# Patient Record
Sex: MALE | Race: WHITE | NOT HISPANIC OR LATINO | Employment: UNEMPLOYED | URBAN - METROPOLITAN AREA
[De-identification: names, ages, dates, MRNs, and addresses within clinical notes are randomized per-mention and may not be internally consistent; named-entity substitution may affect disease eponyms.]

---

## 2017-02-09 ENCOUNTER — CLINICAL SUPPORT (OUTPATIENT)
Dept: DERMATOLOGY | Facility: CLINIC | Age: 57
End: 2017-02-09
Payer: COMMERCIAL

## 2017-02-09 DIAGNOSIS — L57.0 MULTIPLE ACTINIC KERATOSES: ICD-10-CM

## 2017-02-09 PROCEDURE — 99499 UNLISTED E&M SERVICE: CPT | Mod: S$GLB,,, | Performed by: DERMATOLOGY

## 2017-02-09 PROCEDURE — 99999 PR PBB SHADOW E&M-EST. PATIENT-LVL I: CPT | Mod: PBBFAC,,,

## 2017-02-09 PROCEDURE — 96567 PDT DSTR PRMLG LES SKN: CPT | Mod: S$GLB,,, | Performed by: DERMATOLOGY

## 2017-02-09 NOTE — PROGRESS NOTES
Photodynamic Therapy Note.    PDT ordered per Dr. Costello    Patient here today for treatment of actinic keratoses using photodynamic therapy.  Risks including but not limited to burning, stinging, redness, swelling, crusting or blistering of the skin of the area treated were discussed with patient.  Patient elects to proceed with photodynamic therapy.    Treatment area:  Scalp  Treatment area cleaned with rubbing alcohol, Levulan Kerastick (1) applied evenly to entire surface and allowed to absorb for 90 minutes.  Patient then placed under Hugo-U light for 16 minutes 40 seconds.    Patient tolerated treatment well with only mild but tolerable symptoms of discomfort.  Area washed gently with mild soap and water; Zinc oxide sunscreen applied.  Patient advised to avoid any significant light exposure (sun and artificial) for next 48 hours.    RTC:  In 1 month or sooner if concern arises.

## 2017-03-02 ENCOUNTER — CLINICAL SUPPORT (OUTPATIENT)
Dept: DERMATOLOGY | Facility: CLINIC | Age: 57
End: 2017-03-02
Payer: COMMERCIAL

## 2017-03-02 DIAGNOSIS — L57.0 MULTIPLE ACTINIC KERATOSES: ICD-10-CM

## 2017-03-02 PROCEDURE — 99499 UNLISTED E&M SERVICE: CPT | Mod: S$GLB,,, | Performed by: DERMATOLOGY

## 2017-03-02 PROCEDURE — 96567 PDT DSTR PRMLG LES SKN: CPT | Mod: S$GLB,,, | Performed by: DERMATOLOGY

## 2017-03-02 PROCEDURE — 99999 PR PBB SHADOW E&M-EST. PATIENT-LVL I: CPT | Mod: PBBFAC,,,

## 2017-03-02 NOTE — PROGRESS NOTES
Photodynamic Therapy Note.    PDT ordered per Dr. Costello    Patient here today for treatment of actinic keratoses using photodynamic therapy.  Risks including but not limited to burning, stinging, redness, swelling, crusting or blistering of the skin of the area treated were discussed with patient.  Patient elects to proceed with photodynamic therapy.    Treatment area:  Face  Treatment area cleaned with rubbing alcohol, Levulan Kerastick (1) applied evenly to entire surface and allowed to absorb for 60 minutes.  Patient then placed under Hugo-U light for 16 minutes 40 seconds.    Patient tolerated treatment well with only mild but tolerable symptoms of discomfort.  Area washed gently with mild soap and water; Zinc oxide sunscreen applied.  Patient advised to avoid any significant light exposure (sun and artificial) for next 48 hours.    RTC:  In 1 month or sooner if concern arises.

## 2017-03-31 ENCOUNTER — OFFICE VISIT (OUTPATIENT)
Dept: DERMATOLOGY | Facility: CLINIC | Age: 57
End: 2017-03-31
Payer: COMMERCIAL

## 2017-03-31 VITALS — BODY MASS INDEX: 38.74 KG/M2 | WEIGHT: 270 LBS

## 2017-03-31 DIAGNOSIS — L57.0 MULTIPLE ACTINIC KERATOSES: Primary | ICD-10-CM

## 2017-03-31 DIAGNOSIS — Z85.828 HISTORY OF SKIN CANCER: ICD-10-CM

## 2017-03-31 PROCEDURE — 99213 OFFICE O/P EST LOW 20 MIN: CPT | Mod: 25,S$GLB,, | Performed by: DERMATOLOGY

## 2017-03-31 PROCEDURE — 99999 PR PBB SHADOW E&M-EST. PATIENT-LVL II: CPT | Mod: PBBFAC,,, | Performed by: DERMATOLOGY

## 2017-03-31 PROCEDURE — 17003 DESTRUCT PREMALG LES 2-14: CPT | Mod: S$GLB,,, | Performed by: DERMATOLOGY

## 2017-03-31 PROCEDURE — 1160F RVW MEDS BY RX/DR IN RCRD: CPT | Mod: S$GLB,,, | Performed by: DERMATOLOGY

## 2017-03-31 PROCEDURE — 17000 DESTRUCT PREMALG LESION: CPT | Mod: S$GLB,,, | Performed by: DERMATOLOGY

## 2017-03-31 NOTE — MR AVS SNAPSHOT
[facility]   [facility]  [place] [address]  Phone: [phone]  Fax: [phone]                  David Diamond   3/31/2017 10:10 AM   Office Visit    Description:  Male : 1960   Provider:  Rhonda Costello MD   Department:  [facility]           Reason for Visit     Follow-up           Diagnoses this Visit        Comments    Multiple actinic keratoses    -  Primary     History of skin cancer     scc scalp           To Do List           Goals (5 Years of Data)     None      Follow-Up and Disposition     Return in about 6 months (around 2017).      [facility]     [facility] -  Assistance  Unless otherwise directed by your provider, please contact [facility], our nurse care line that is available for  assistance.     Registered nurses in the [facility] Center provide: appointment scheduling, clinical advisement, health education, and other advisory services.  Call: [phone] (toll free)               Medications           Message regarding Medications     Verify the changes and/or additions to your medication regime listed below are the same as discussed with your clinician today.  If any of these changes or additions are incorrect, please notify your healthcare provider.             Verify that the below list of medications is an accurate representation of the medications you are currently taking.  If none reported, the list may be blank. If incorrect, please contact your healthcare provider. Carry this list with you in case of emergency.           Current Medications     acyclovir (ZOVIRAX) 200 MG capsule Take 1 capsule (200 mg total) by mouth every 4 (four) hours while awake.    fluorouracil (EFUDEX) 5 % cream Use hs on scalp           Clinical Reference Information           Your Vitals Were     Weight BMI             122.5 kg (270 lb) 38.74 kg/m2         Allergies as of 3/31/2017     Pcn [Penicillins]       Immunizations Administered on Date of Encounter - 3/31/2017     None      [facility] Sign-Up     Activating your [facility] account is as easy as 1-2-3!     1) Visit [email or web address], select Sign Up Now, enter this activation code and your date of birth, then select Next.  [email or web address]  Expires: 5/15/2017 11:42 AM      2) Create a username and password to use when you visit [facility] in the future and select a security question in case you lose your password and select Next.    3) Enter your e-mail address and click Sign Up!    Additional Information  If you have questions, please e-mail [email or web address] or call [phone] to talk to our [facility] staff. Remember, [facility] is NOT to be used for urgent needs. For medical emergencies, dial 911.         Language Assistance Services     ATTENTION: Language assistance services are available, free of charge. Please call [phone].      ATENCIÓN: Si habla español, tiene a kiran disposición servicios gratuitos de asistencia lingüística. Llame al [phone].     GRACE Ý: N?u b?n nói Ti?ng Vi?t, có các d?ch v? h? tr? ngôn ng? mi?n phí dành cho b?n. G?i s? [phone].         [facility] complies with applicable Federal civil rights laws and does not discriminate on the basis of race, color, national origin, age, disability, or sex.

## 2017-03-31 NOTE — PROGRESS NOTES
Subjective:       Patient ID:  David Diamond is a 57 y.o. male who presents for   Chief Complaint   Patient presents with    Follow-up     PDT face and scalp      HPI Comments: History of Present Illness: The patient presents with chief complaint of spot .  Location: left ear  Duration: weeks  Signs/Symptoms: growing    Prior treatments: none        Review of Systems   Constitutional: Negative for fever.   Skin: Negative for itching and rash.   Hematologic/Lymphatic: Does not bruise/bleed easily.        Objective:    Physical Exam   Constitutional: He appears well-developed and well-nourished. No distress.   Neurological: He is alert and oriented to person, place, and time. He is not disoriented.   Psychiatric: He has a normal mood and affect.   Skin:   Areas Examined (abnormalities noted in diagram):   Head / Face Inspection Performed  Neck Inspection Performed  Chest / Axilla Inspection Performed  Back Inspection Performed  RUE Inspected  LUE Inspection Performed                   Diagram Legend     Erythematous scaling macule/papule c/w actinic keratosis        Surgical scar with no sign of skin cancer recurrence     Assessment / Plan:        Multiple actinic keratoses   Cryosurgery Procedure Note    Verbal consent from the patient is obtained and the patient is aware of the precancerous quality and need for treatment of these lesions. Liquid nitrogen cryosurgery is applied to the 2 actinic keratoses, as detailed in the physical exam, to produce a freeze injury.      History of skin cancer  Comments:  scc scalp             Return in about 6 months (around 9/30/2017).

## 2017-06-28 DIAGNOSIS — Z12.11 COLON CANCER SCREENING: ICD-10-CM

## 2018-02-12 ENCOUNTER — TELEPHONE (OUTPATIENT)
Dept: DERMATOLOGY | Facility: CLINIC | Age: 58
End: 2018-02-12

## 2018-02-12 NOTE — TELEPHONE ENCOUNTER
----- Message from Norma Berkowitz sent at 2/12/2018  4:23 PM CST -----  Contact: pts wife   MCR-pt- pts wife is calling to speak with the nurse pt has a spot on his scalp that is cancerous pt has had skin cancer surgery pt sees Dr Melgar pts wife  on the same spot pt needs to be seen asap can you please call pts wife at [phone] or home [phone]    CRISTINA

## 2018-02-22 ENCOUNTER — TELEPHONE (OUTPATIENT)
Dept: DERMATOLOGY | Facility: CLINIC | Age: 58
End: 2018-02-22

## 2018-02-22 NOTE — TELEPHONE ENCOUNTER
----- Message from Norma Berkowitz sent at 2/22/2018 10:41 AM CST -----  Contact: pts wife   MCR-pt- pt is returning the nurses phone call pt is still out of town he will keep the 3-9 appt if nothing else comes up earlier before 3-9 can you please call pts wife on her cell phone at   [phone] or home [phone].     CRISTINA

## 2018-02-28 ENCOUNTER — OFFICE VISIT (OUTPATIENT)
Dept: DERMATOLOGY | Facility: CLINIC | Age: 58
End: 2018-02-28
Payer: COMMERCIAL

## 2018-02-28 VITALS — BODY MASS INDEX: 38.74 KG/M2 | WEIGHT: 270 LBS

## 2018-02-28 DIAGNOSIS — L57.0 MULTIPLE ACTINIC KERATOSES: Primary | ICD-10-CM

## 2018-02-28 DIAGNOSIS — L82.1 SEBORRHEIC KERATOSES: ICD-10-CM

## 2018-02-28 DIAGNOSIS — Z85.828 HISTORY OF SKIN CANCER: ICD-10-CM

## 2018-02-28 DIAGNOSIS — L81.4 LENTIGINES: ICD-10-CM

## 2018-02-28 PROCEDURE — 99213 OFFICE O/P EST LOW 20 MIN: CPT | Mod: 25,S$GLB,, | Performed by: DERMATOLOGY

## 2018-02-28 PROCEDURE — 17000 DESTRUCT PREMALG LESION: CPT | Mod: S$GLB,,, | Performed by: DERMATOLOGY

## 2018-02-28 PROCEDURE — 99999 PR PBB SHADOW E&M-EST. PATIENT-LVL III: CPT | Mod: PBBFAC,,, | Performed by: DERMATOLOGY

## 2018-02-28 PROCEDURE — 17003 DESTRUCT PREMALG LES 2-14: CPT | Mod: S$GLB,,, | Performed by: DERMATOLOGY

## 2018-02-28 NOTE — PROGRESS NOTES
Subjective:       Patient ID:  David Diamond is a 58 y.o. male who presents for   Chief Complaint   Patient presents with    Lesion     scalp     History of Present Illness: The patient presents with chief complaint of spots.  Location: scalp  Duration: over a month  Signs/Symptoms: none    Prior treatments: none          Review of Systems   Constitutional: Negative for fever.   Skin: Negative for itching and rash.   Hematologic/Lymphatic: Does not bruise/bleed easily.        Objective:    Physical Exam   Constitutional: He appears well-developed and well-nourished. No distress.   Neurological: He is alert and oriented to person, place, and time. He is not disoriented.   Psychiatric: He has a normal mood and affect.   Skin:   Areas Examined (abnormalities noted in diagram):   Scalp / Hair Palpated and Inspected  Head / Face Inspection Performed  Neck Inspection Performed  Chest / Axilla Inspection Performed  RUE Inspected  LUE Inspection Performed                   Diagram Legend     Erythematous scaling macule/papule c/w actinic keratosis       Vascular papule c/w angioma      Pigmented verrucoid papule/plaque c/w seborrheic keratosis      Yellow umbilicated papule c/w sebaceous hyperplasia      Irregularly shaped tan macule c/w lentigo     1-2 mm smooth white papules consistent with Milia      Movable subcutaneous cyst with punctum c/w epidermal inclusion cyst      Subcutaneous movable cyst c/w pilar cyst      Firm pink to brown papule c/w dermatofibroma      Pedunculated fleshy papule(s) c/w skin tag(s)      Evenly pigmented macule c/w junctional nevus     Mildly variegated pigmented, slightly irregular-bordered macule c/w mildly atypical nevus      Flesh colored to evenly pigmented papule c/w intradermal nevus       Pink pearly papule/plaque c/w basal cell carcinoma      Erythematous hyperkeratotic cursted plaque c/w SCC      Surgical scar with no sign of skin cancer recurrence      Open and closed comedones  "     Inflammatory papules and pustules      Verrucoid papule consistent consistent with wart     Erythematous eczematous patches and plaques     Dystrophic onycholytic nail with subungual debris c/w onychomycosis     Umbilicated papule    Erythematous-base heme-crusted tan verrucoid plaque consistent with inflamed seborrheic keratosis     Erythematous Silvery Scaling Plaque c/w Psoriasis     See annotation      Assessment / Plan:        Multiple actinic keratoses   Cryosurgery Procedure Note    Verbal consent from the patient is obtained and the patient is aware of the precancerous quality and need for treatment of these lesions. Liquid nitrogen cryosurgery is applied to the 2 actinic keratoses, as detailed in the physical exam, to produce a freeze injury.    History of skin cancer  Scc scalp removed mohs surgery    Seborrheic keratoses  reassurance      Lentigines  The "ABCD" rules to observe pigmented lesions were reviewed.               Follow-up in about 6 months (around 8/28/2018).  "

## 2018-09-13 ENCOUNTER — OFFICE VISIT (OUTPATIENT)
Dept: DERMATOLOGY | Facility: CLINIC | Age: 58
End: 2018-09-13
Payer: COMMERCIAL

## 2018-09-13 VITALS — BODY MASS INDEX: 38.74 KG/M2 | WEIGHT: 270 LBS

## 2018-09-13 DIAGNOSIS — L81.4 LENTIGINES: ICD-10-CM

## 2018-09-13 DIAGNOSIS — L57.0 ACTINIC KERATOSIS: ICD-10-CM

## 2018-09-13 DIAGNOSIS — Z85.828 HISTORY OF SKIN CANCER: ICD-10-CM

## 2018-09-13 DIAGNOSIS — L82.1 SEBORRHEIC KERATOSES: Primary | ICD-10-CM

## 2018-09-13 PROCEDURE — 3008F BODY MASS INDEX DOCD: CPT | Mod: CPTII,S$GLB,, | Performed by: DERMATOLOGY

## 2018-09-13 PROCEDURE — 99999 PR PBB SHADOW E&M-EST. PATIENT-LVL III: CPT | Mod: PBBFAC,,, | Performed by: DERMATOLOGY

## 2018-09-13 PROCEDURE — 17000 DESTRUCT PREMALG LESION: CPT | Mod: S$GLB,,, | Performed by: DERMATOLOGY

## 2018-09-13 PROCEDURE — 99213 OFFICE O/P EST LOW 20 MIN: CPT | Mod: 25,S$GLB,, | Performed by: DERMATOLOGY

## 2018-09-13 NOTE — PROGRESS NOTES
Subjective:       Patient ID:  David Diamond is a 58 y.o. male who presents for   Chief Complaint   Patient presents with    Follow-up     scalp    Spot     forehead     History of Present Illness: The patient presents with chief complaint of spot.  Location: right temple  Duration: over a year  Signs/Symptoms: growing    Prior treatments: none          Review of Systems   Constitutional: Negative for fever.   Skin: Negative for itching and rash.   Hematologic/Lymphatic: Does not bruise/bleed easily.        Objective:    Physical Exam   Constitutional: He appears well-developed and well-nourished. No distress.   Neurological: He is alert and oriented to person, place, and time. He is not disoriented.   Psychiatric: He has a normal mood and affect.   Skin:   Areas Examined (abnormalities noted in diagram):   Scalp / Hair Palpated and Inspected  Head / Face Inspection Performed  Neck Inspection Performed  Chest / Axilla Inspection Performed  RUE Inspected  LUE Inspection Performed                   Diagram Legend     Erythematous scaling macule/papule c/w actinic keratosis       Vascular papule c/w angioma      Pigmented verrucoid papule/plaque c/w seborrheic keratosis      Yellow umbilicated papule c/w sebaceous hyperplasia      Irregularly shaped tan macule c/w lentigo     1-2 mm smooth white papules consistent with Milia      Movable subcutaneous cyst with punctum c/w epidermal inclusion cyst      Subcutaneous movable cyst c/w pilar cyst      Firm pink to brown papule c/w dermatofibroma      Pedunculated fleshy papule(s) c/w skin tag(s)      Evenly pigmented macule c/w junctional nevus     Mildly variegated pigmented, slightly irregular-bordered macule c/w mildly atypical nevus      Flesh colored to evenly pigmented papule c/w intradermal nevus       Pink pearly papule/plaque c/w basal cell carcinoma      Erythematous hyperkeratotic cursted plaque c/w SCC      Surgical scar with no sign of skin cancer  recurrence      Open and closed comedones      Inflammatory papules and pustules      Verrucoid papule consistent consistent with wart     Erythematous eczematous patches and plaques     Dystrophic onycholytic nail with subungual debris c/w onychomycosis     Umbilicated papule    Erythematous-base heme-crusted tan verrucoid plaque consistent with inflamed seborrheic keratosis     Erythematous Silvery Scaling Plaque c/w Psoriasis     See annotation      Assessment / Plan:        Seborrheic keratoses  Brochure provided      Actinic keratosis   Cryosurgery Procedure Note    Verbal consent from the patient is obtained and the patient is aware of the precancerous quality and need for treatment of these lesions. Liquid nitrogen cryosurgery is applied to the 1 actinic keratoses, as detailed in the physical exam, to produce a freeze injury.      History of skin cancer  Scc on scalp    Lentigines  sont sunscreen             Follow-up in about 6 months (around 3/13/2019).

## 2019-02-22 ENCOUNTER — TELEPHONE (OUTPATIENT)
Dept: DERMATOLOGY | Facility: CLINIC | Age: 59
End: 2019-02-22

## 2019-05-07 ENCOUNTER — OFFICE VISIT (OUTPATIENT)
Dept: DERMATOLOGY | Facility: CLINIC | Age: 59
End: 2019-05-07
Payer: COMMERCIAL

## 2019-05-07 VITALS — WEIGHT: 270 LBS | BODY MASS INDEX: 38.74 KG/M2

## 2019-05-07 DIAGNOSIS — L81.4 LENTIGINES: ICD-10-CM

## 2019-05-07 DIAGNOSIS — Z85.828 HISTORY OF SKIN CANCER: ICD-10-CM

## 2019-05-07 DIAGNOSIS — L82.1 SEBORRHEIC KERATOSES: Primary | ICD-10-CM

## 2019-05-07 PROCEDURE — 99213 OFFICE O/P EST LOW 20 MIN: CPT | Mod: S$GLB,,, | Performed by: DERMATOLOGY

## 2019-05-07 PROCEDURE — 3008F PR BODY MASS INDEX (BMI) DOCUMENTED: ICD-10-PCS | Mod: CPTII,S$GLB,, | Performed by: DERMATOLOGY

## 2019-05-07 PROCEDURE — 99999 PR PBB SHADOW E&M-EST. PATIENT-LVL III: ICD-10-PCS | Mod: PBBFAC,,, | Performed by: DERMATOLOGY

## 2019-05-07 PROCEDURE — 99213 PR OFFICE/OUTPT VISIT, EST, LEVL III, 20-29 MIN: ICD-10-PCS | Mod: S$GLB,,, | Performed by: DERMATOLOGY

## 2019-05-07 PROCEDURE — 99999 PR PBB SHADOW E&M-EST. PATIENT-LVL III: CPT | Mod: PBBFAC,,, | Performed by: DERMATOLOGY

## 2019-05-07 PROCEDURE — 3008F BODY MASS INDEX DOCD: CPT | Mod: CPTII,S$GLB,, | Performed by: DERMATOLOGY

## 2019-05-07 NOTE — PROGRESS NOTES
Subjective:       Patient ID:  David Diamond is a 59 y.o. male who presents for   Chief Complaint   Patient presents with    Follow-up     face, scalp     This is a high risk patient here to check for the development of new lesions.        Review of Systems   Constitutional: Negative for fever, chills, weight loss, weight gain, fatigue, night sweats and malaise.   Skin: Positive for daily sunscreen use, activity-related sunscreen use and wears hat.   Hematologic/Lymphatic: Does not bruise/bleed easily.        Objective:    Physical Exam   Constitutional: He appears well-developed and well-nourished. No distress.   Neurological: He is alert and oriented to person, place, and time. He is not disoriented.   Psychiatric: He has a normal mood and affect.   Skin:   Areas Examined (abnormalities noted in diagram):   Scalp / Hair Palpated and Inspected  Head / Face Inspection Performed  Neck Inspection Performed  Chest / Axilla Inspection Performed  RUE Inspected  LUE Inspection Performed                   Diagram Legend     Erythematous scaling macule/papule c/w actinic keratosis       Vascular papule c/w angioma      Pigmented verrucoid papule/plaque c/w seborrheic keratosis      Yellow umbilicated papule c/w sebaceous hyperplasia      Irregularly shaped tan macule c/w lentigo     1-2 mm smooth white papules consistent with Milia      Movable subcutaneous cyst with punctum c/w epidermal inclusion cyst      Subcutaneous movable cyst c/w pilar cyst      Firm pink to brown papule c/w dermatofibroma      Pedunculated fleshy papule(s) c/w skin tag(s)      Evenly pigmented macule c/w junctional nevus     Mildly variegated pigmented, slightly irregular-bordered macule c/w mildly atypical nevus      Flesh colored to evenly pigmented papule c/w intradermal nevus       Pink pearly papule/plaque c/w basal cell carcinoma      Erythematous hyperkeratotic cursted plaque c/w SCC      Surgical scar with no sign of skin cancer  "recurrence      Open and closed comedones      Inflammatory papules and pustules      Verrucoid papule consistent consistent with wart     Erythematous eczematous patches and plaques     Dystrophic onycholytic nail with subungual debris c/w onychomycosis     Umbilicated papule    Erythematous-base heme-crusted tan verrucoid plaque consistent with inflamed seborrheic keratosis     Erythematous Silvery Scaling Plaque c/w Psoriasis     See annotation      Assessment / Plan:        Seborrheic keratoses  reassurance  Brochure provided      History of skin cancer  Comments:  scc in situ scalp removed mohs surgery    Lentigines  The "ABCD" rules to observe pigmented lesions were reviewed.               6 month return  "

## 2021-07-20 ENCOUNTER — TELEPHONE (OUTPATIENT)
Dept: INTERNAL MEDICINE | Facility: CLINIC | Age: 61
End: 2021-07-20

## 2021-08-02 ENCOUNTER — LAB VISIT (OUTPATIENT)
Dept: LAB | Facility: HOSPITAL | Age: 61
End: 2021-08-02
Attending: INTERNAL MEDICINE
Payer: COMMERCIAL

## 2021-08-02 ENCOUNTER — OFFICE VISIT (OUTPATIENT)
Dept: INTERNAL MEDICINE | Facility: CLINIC | Age: 61
End: 2021-08-02
Payer: COMMERCIAL

## 2021-08-02 VITALS
HEART RATE: 92 BPM | OXYGEN SATURATION: 97 % | BODY MASS INDEX: 30.56 KG/M2 | SYSTOLIC BLOOD PRESSURE: 118 MMHG | WEIGHT: 213 LBS | DIASTOLIC BLOOD PRESSURE: 82 MMHG

## 2021-08-02 DIAGNOSIS — Z01.810 PREOP CARDIOVASCULAR EXAM: Primary | ICD-10-CM

## 2021-08-02 DIAGNOSIS — Z12.11 ENCOUNTER FOR FIT (FECAL IMMUNOCHEMICAL TEST) SCREENING: ICD-10-CM

## 2021-08-02 DIAGNOSIS — Z12.5 SCREENING FOR PROSTATE CANCER: ICD-10-CM

## 2021-08-02 DIAGNOSIS — M16.0 BILATERAL HIP JOINT ARTHRITIS: ICD-10-CM

## 2021-08-02 DIAGNOSIS — Z00.00 ROUTINE PHYSICAL EXAMINATION: ICD-10-CM

## 2021-08-02 LAB — COMPLEXED PSA SERPL-MCNC: 3.1 NG/ML (ref 0–4)

## 2021-08-02 PROCEDURE — 1126F PR PAIN SEVERITY QUANTIFIED, NO PAIN PRESENT: ICD-10-PCS | Mod: CPTII,S$GLB,, | Performed by: INTERNAL MEDICINE

## 2021-08-02 PROCEDURE — 84153 ASSAY OF PSA TOTAL: CPT | Performed by: INTERNAL MEDICINE

## 2021-08-02 PROCEDURE — 36415 COLL VENOUS BLD VENIPUNCTURE: CPT | Performed by: INTERNAL MEDICINE

## 2021-08-02 PROCEDURE — 3079F PR MOST RECENT DIASTOLIC BLOOD PRESSURE 80-89 MM HG: ICD-10-PCS | Mod: CPTII,S$GLB,, | Performed by: INTERNAL MEDICINE

## 2021-08-02 PROCEDURE — 3008F BODY MASS INDEX DOCD: CPT | Mod: CPTII,S$GLB,, | Performed by: INTERNAL MEDICINE

## 2021-08-02 PROCEDURE — 1160F RVW MEDS BY RX/DR IN RCRD: CPT | Mod: CPTII,S$GLB,, | Performed by: INTERNAL MEDICINE

## 2021-08-02 PROCEDURE — 1160F PR REVIEW ALL MEDS BY PRESCRIBER/CLIN PHARMACIST DOCUMENTED: ICD-10-PCS | Mod: CPTII,S$GLB,, | Performed by: INTERNAL MEDICINE

## 2021-08-02 PROCEDURE — 3079F DIAST BP 80-89 MM HG: CPT | Mod: CPTII,S$GLB,, | Performed by: INTERNAL MEDICINE

## 2021-08-02 PROCEDURE — 99204 OFFICE O/P NEW MOD 45 MIN: CPT | Mod: S$GLB,,, | Performed by: INTERNAL MEDICINE

## 2021-08-02 PROCEDURE — 3008F PR BODY MASS INDEX (BMI) DOCUMENTED: ICD-10-PCS | Mod: CPTII,S$GLB,, | Performed by: INTERNAL MEDICINE

## 2021-08-02 PROCEDURE — 1126F AMNT PAIN NOTED NONE PRSNT: CPT | Mod: CPTII,S$GLB,, | Performed by: INTERNAL MEDICINE

## 2021-08-02 PROCEDURE — 99999 PR PBB SHADOW E&M-EST. PATIENT-LVL III: CPT | Mod: PBBFAC,,, | Performed by: INTERNAL MEDICINE

## 2021-08-02 PROCEDURE — 3074F SYST BP LT 130 MM HG: CPT | Mod: CPTII,S$GLB,, | Performed by: INTERNAL MEDICINE

## 2021-08-02 PROCEDURE — 3074F PR MOST RECENT SYSTOLIC BLOOD PRESSURE < 130 MM HG: ICD-10-PCS | Mod: CPTII,S$GLB,, | Performed by: INTERNAL MEDICINE

## 2021-08-02 PROCEDURE — 99999 PR PBB SHADOW E&M-EST. PATIENT-LVL III: ICD-10-PCS | Mod: PBBFAC,,, | Performed by: INTERNAL MEDICINE

## 2021-08-02 PROCEDURE — 1159F PR MEDICATION LIST DOCUMENTED IN MEDICAL RECORD: ICD-10-PCS | Mod: CPTII,S$GLB,, | Performed by: INTERNAL MEDICINE

## 2021-08-02 PROCEDURE — 99204 PR OFFICE/OUTPT VISIT, NEW, LEVL IV, 45-59 MIN: ICD-10-PCS | Mod: S$GLB,,, | Performed by: INTERNAL MEDICINE

## 2021-08-02 PROCEDURE — 1159F MED LIST DOCD IN RCRD: CPT | Mod: CPTII,S$GLB,, | Performed by: INTERNAL MEDICINE

## 2021-08-02 PROCEDURE — 80061 LIPID PANEL: CPT | Performed by: INTERNAL MEDICINE

## 2021-08-02 RX ORDER — MELOXICAM 15 MG/1
15 TABLET ORAL DAILY
COMMUNITY
Start: 2021-07-30 | End: 2023-11-24

## 2021-08-03 ENCOUNTER — PATIENT MESSAGE (OUTPATIENT)
Dept: INTERNAL MEDICINE | Facility: CLINIC | Age: 61
End: 2021-08-03

## 2021-08-03 LAB
CHOLEST SERPL-MCNC: 190 MG/DL (ref 120–199)
CHOLEST/HDLC SERPL: 3.2 {RATIO} (ref 2–5)
HDLC SERPL-MCNC: 59 MG/DL (ref 40–75)
HDLC SERPL: 31.1 % (ref 20–50)
LDLC SERPL CALC-MCNC: 109.4 MG/DL (ref 63–159)
NONHDLC SERPL-MCNC: 131 MG/DL
TRIGL SERPL-MCNC: 108 MG/DL (ref 30–150)

## 2021-08-10 ENCOUNTER — TELEPHONE (OUTPATIENT)
Dept: INTERNAL MEDICINE | Facility: CLINIC | Age: 61
End: 2021-08-10

## 2021-10-11 ENCOUNTER — OFFICE VISIT (OUTPATIENT)
Dept: INTERNAL MEDICINE | Facility: CLINIC | Age: 61
End: 2021-10-11
Payer: COMMERCIAL

## 2021-10-11 VITALS
BODY MASS INDEX: 31.82 KG/M2 | WEIGHT: 222.25 LBS | HEIGHT: 70 IN | SYSTOLIC BLOOD PRESSURE: 108 MMHG | HEART RATE: 75 BPM | DIASTOLIC BLOOD PRESSURE: 80 MMHG | OXYGEN SATURATION: 97 %

## 2021-10-11 DIAGNOSIS — Z01.810 PREOP CARDIOVASCULAR EXAM: Primary | ICD-10-CM

## 2021-10-11 DIAGNOSIS — M16.11 ARTHRITIS OF RIGHT HIP: ICD-10-CM

## 2021-10-11 PROCEDURE — 3079F PR MOST RECENT DIASTOLIC BLOOD PRESSURE 80-89 MM HG: ICD-10-PCS | Mod: CPTII,S$GLB,, | Performed by: INTERNAL MEDICINE

## 2021-10-11 PROCEDURE — 99213 OFFICE O/P EST LOW 20 MIN: CPT | Mod: S$GLB,,, | Performed by: INTERNAL MEDICINE

## 2021-10-11 PROCEDURE — 99999 PR PBB SHADOW E&M-EST. PATIENT-LVL III: CPT | Mod: PBBFAC,,, | Performed by: INTERNAL MEDICINE

## 2021-10-11 PROCEDURE — 3008F PR BODY MASS INDEX (BMI) DOCUMENTED: ICD-10-PCS | Mod: CPTII,S$GLB,, | Performed by: INTERNAL MEDICINE

## 2021-10-11 PROCEDURE — 3074F SYST BP LT 130 MM HG: CPT | Mod: CPTII,S$GLB,, | Performed by: INTERNAL MEDICINE

## 2021-10-11 PROCEDURE — 3008F BODY MASS INDEX DOCD: CPT | Mod: CPTII,S$GLB,, | Performed by: INTERNAL MEDICINE

## 2021-10-11 PROCEDURE — 3074F PR MOST RECENT SYSTOLIC BLOOD PRESSURE < 130 MM HG: ICD-10-PCS | Mod: CPTII,S$GLB,, | Performed by: INTERNAL MEDICINE

## 2021-10-11 PROCEDURE — 3079F DIAST BP 80-89 MM HG: CPT | Mod: CPTII,S$GLB,, | Performed by: INTERNAL MEDICINE

## 2021-10-11 PROCEDURE — 1160F PR REVIEW ALL MEDS BY PRESCRIBER/CLIN PHARMACIST DOCUMENTED: ICD-10-PCS | Mod: CPTII,S$GLB,, | Performed by: INTERNAL MEDICINE

## 2021-10-11 PROCEDURE — 1160F RVW MEDS BY RX/DR IN RCRD: CPT | Mod: CPTII,S$GLB,, | Performed by: INTERNAL MEDICINE

## 2021-10-11 PROCEDURE — 99213 PR OFFICE/OUTPT VISIT, EST, LEVL III, 20-29 MIN: ICD-10-PCS | Mod: S$GLB,,, | Performed by: INTERNAL MEDICINE

## 2021-10-11 PROCEDURE — 1159F PR MEDICATION LIST DOCUMENTED IN MEDICAL RECORD: ICD-10-PCS | Mod: CPTII,S$GLB,, | Performed by: INTERNAL MEDICINE

## 2021-10-11 PROCEDURE — 99999 PR PBB SHADOW E&M-EST. PATIENT-LVL III: ICD-10-PCS | Mod: PBBFAC,,, | Performed by: INTERNAL MEDICINE

## 2021-10-11 PROCEDURE — 1159F MED LIST DOCD IN RCRD: CPT | Mod: CPTII,S$GLB,, | Performed by: INTERNAL MEDICINE

## 2021-10-26 ENCOUNTER — TELEPHONE (OUTPATIENT)
Dept: INTERNAL MEDICINE | Facility: CLINIC | Age: 61
End: 2021-10-26
Payer: COMMERCIAL

## 2021-10-28 ENCOUNTER — TELEPHONE (OUTPATIENT)
Dept: INTERNAL MEDICINE | Facility: CLINIC | Age: 61
End: 2021-10-28
Payer: COMMERCIAL

## 2021-10-28 NOTE — TELEPHONE ENCOUNTER
----- Message from Norah Disla sent at 10/28/2021 10:49 AM CDT -----  Contact: Pt @[phone]  Patient is returning a phone call.  Who left a message for the patient: Malia     Does patient know what this is regarding:  yes     Would you like a call back, or a response through your [facility] portal?:  Call Back     Comments:    Pt states that he was last seen by the provider on 10/11/2021. Please call back to advise.

## 2021-10-28 NOTE — TELEPHONE ENCOUNTER
----- Message from Norah Disla sent at 10/28/2021 10:49 AM CDT -----  Contact: Pt @[phone]  Patient is returning a phone call.  Who left a message for the patient: Malia     Does patient know what this is regarding:  yes     Would you like a call back, or a response through your [facility] portal?:  Call Back     Comments:    Pt states that he was last seen by the provider on 10/11/2021. Please call back to advise.

## 2021-10-28 NOTE — TELEPHONE ENCOUNTER
I guess I do not understand the question.  There is a clearance note from me in the chart and I sent a note to his surgeon.  This may have been something done by Connie.  Can we send my clearance note to the orthopedic surgeon and  Let the patient know we are doing so.

## 2021-10-29 NOTE — TELEPHONE ENCOUNTER
OK, tell me what to do because I am about to be out for a week. He did not have the paperwork at the visit so let's figure out how to get him what he needs.

## 2021-11-10 ENCOUNTER — TELEPHONE (OUTPATIENT)
Dept: INTERNAL MEDICINE | Facility: CLINIC | Age: 61
End: 2021-11-10
Payer: COMMERCIAL

## 2022-03-18 ENCOUNTER — PATIENT MESSAGE (OUTPATIENT)
Dept: ADMINISTRATIVE | Facility: HOSPITAL | Age: 62
End: 2022-03-18
Payer: COMMERCIAL

## 2022-04-12 ENCOUNTER — PATIENT OUTREACH (OUTPATIENT)
Dept: ADMINISTRATIVE | Facility: OTHER | Age: 62
End: 2022-04-12
Payer: COMMERCIAL

## 2022-04-13 ENCOUNTER — OFFICE VISIT (OUTPATIENT)
Dept: DERMATOLOGY | Facility: CLINIC | Age: 62
End: 2022-04-13
Payer: COMMERCIAL

## 2022-04-13 VITALS — BODY MASS INDEX: 31.85 KG/M2 | WEIGHT: 222 LBS

## 2022-04-13 DIAGNOSIS — Z85.828 HISTORY OF SKIN CANCER: ICD-10-CM

## 2022-04-13 DIAGNOSIS — L82.1 SEBORRHEIC KERATOSES: ICD-10-CM

## 2022-04-13 DIAGNOSIS — D18.01 CHERRY ANGIOMA: ICD-10-CM

## 2022-04-13 DIAGNOSIS — L57.0 MULTIPLE ACTINIC KERATOSES: Primary | ICD-10-CM

## 2022-04-13 PROCEDURE — 3008F PR BODY MASS INDEX (BMI) DOCUMENTED: ICD-10-PCS | Mod: CPTII,S$GLB,, | Performed by: DERMATOLOGY

## 2022-04-13 PROCEDURE — 1160F PR REVIEW ALL MEDS BY PRESCRIBER/CLIN PHARMACIST DOCUMENTED: ICD-10-PCS | Mod: CPTII,S$GLB,, | Performed by: DERMATOLOGY

## 2022-04-13 PROCEDURE — 17110 PR DESTRUCTION BENIGN LESIONS UP TO 14: ICD-10-PCS | Mod: S$GLB,,, | Performed by: DERMATOLOGY

## 2022-04-13 PROCEDURE — 99999 PR PBB SHADOW E&M-EST. PATIENT-LVL III: CPT | Mod: PBBFAC,,, | Performed by: DERMATOLOGY

## 2022-04-13 PROCEDURE — 99999 PR PBB SHADOW E&M-EST. PATIENT-LVL III: ICD-10-PCS | Mod: PBBFAC,,, | Performed by: DERMATOLOGY

## 2022-04-13 PROCEDURE — 99214 OFFICE O/P EST MOD 30 MIN: CPT | Mod: 25,S$GLB,, | Performed by: DERMATOLOGY

## 2022-04-13 PROCEDURE — 1159F MED LIST DOCD IN RCRD: CPT | Mod: CPTII,S$GLB,, | Performed by: DERMATOLOGY

## 2022-04-13 PROCEDURE — 3008F BODY MASS INDEX DOCD: CPT | Mod: CPTII,S$GLB,, | Performed by: DERMATOLOGY

## 2022-04-13 PROCEDURE — 1160F RVW MEDS BY RX/DR IN RCRD: CPT | Mod: CPTII,S$GLB,, | Performed by: DERMATOLOGY

## 2022-04-13 PROCEDURE — 99214 PR OFFICE/OUTPT VISIT, EST, LEVL IV, 30-39 MIN: ICD-10-PCS | Mod: 25,S$GLB,, | Performed by: DERMATOLOGY

## 2022-04-13 PROCEDURE — 1159F PR MEDICATION LIST DOCUMENTED IN MEDICAL RECORD: ICD-10-PCS | Mod: CPTII,S$GLB,, | Performed by: DERMATOLOGY

## 2022-04-13 PROCEDURE — 17110 DESTRUCTION B9 LES UP TO 14: CPT | Mod: S$GLB,,, | Performed by: DERMATOLOGY

## 2022-04-13 RX ORDER — FLUOROURACIL 50 MG/G
CREAM TOPICAL
Qty: 40 G | Refills: 3 | Status: SHIPPED | OUTPATIENT
Start: 2022-04-13 | End: 2023-08-11

## 2022-04-13 NOTE — PROGRESS NOTES
Subjective:       Patient ID:  David Diamond is a 62 y.o. male who presents for   Chief Complaint   Patient presents with    Follow-up     This is a high risk patient here to check for the development of new lesions.  Spots on scalp and face      Review of Systems   Constitutional: Negative for fever, chills, weight loss, weight gain, fatigue and malaise.   Skin: Positive for daily sunscreen use, activity-related sunscreen use and wears hat.   Hematologic/Lymphatic: Does not bruise/bleed easily.        Objective:    Physical Exam   Constitutional: He appears well-developed and well-nourished. No distress.   Neurological: He is alert and oriented to person, place, and time. He is not disoriented.   Psychiatric: He has a normal mood and affect.   Skin:   Areas Examined (abnormalities noted in diagram):   Head / Face Inspection Performed  Neck Inspection Performed  Chest / Axilla Inspection Performed  Back Inspection Performed  RUE Inspected  LUE Inspection Performed                   Diagram Legend     Erythematous scaling macule/papule c/w actinic keratosis       Vascular papule c/w angioma      Pigmented verrucoid papule/plaque c/w seborrheic keratosis      Yellow umbilicated papule c/w sebaceous hyperplasia      Irregularly shaped tan macule c/w lentigo     1-2 mm smooth white papules consistent with Milia      Movable subcutaneous cyst with punctum c/w epidermal inclusion cyst      Subcutaneous movable cyst c/w pilar cyst      Firm pink to brown papule c/w dermatofibroma      Pedunculated fleshy papule(s) c/w skin tag(s)      Evenly pigmented macule c/w junctional nevus     Mildly variegated pigmented, slightly irregular-bordered macule c/w mildly atypical nevus      Flesh colored to evenly pigmented papule c/w intradermal nevus       Pink pearly papule/plaque c/w basal cell carcinoma      Erythematous hyperkeratotic cursted plaque c/w SCC      Surgical scar with no sign of skin cancer recurrence      Open and  closed comedones      Inflammatory papules and pustules      Verrucoid papule consistent consistent with wart     Erythematous eczematous patches and plaques     Dystrophic onycholytic nail with subungual debris c/w onychomycosis     Umbilicated papule    Erythematous-base heme-crusted tan verrucoid plaque consistent with inflamed seborrheic keratosis     Erythematous Silvery Scaling Plaque c/w Psoriasis     See annotation      Assessment / Plan:        Multiple actinic keratoses  -     fluorouraciL (EFUDEX) 5 % cream; Use hs for 2 weeks  Dispense: 40 g; Refill: 3 for scalp    Seborrheic keratoses  Cryosurgery procedure note:    Verbal consent from the patient is obtained including, but not limited to, risk of hypopigmentation/hyperpigmentation, scar, recurrence of lesion. Liquid nitrogen cryosurgery is applied to 2 lesions left forehead to produce a freeze injury.       Gleason angiomas  reassurance      History of skin cancer  Comments:  sccis removed mohs surgery  Area(s) of previous NMSC evaluated with no signs of recurrence.    . No lesions suspicious for malignancy noted.    Recommend daily sun protection/avoidance and use of at least SPF 30, broad spectrum sunscreen (OTC drug). ]  \             Follow up in about 6 months (around 10/13/2022).

## 2022-05-30 ENCOUNTER — PATIENT MESSAGE (OUTPATIENT)
Dept: ADMINISTRATIVE | Facility: HOSPITAL | Age: 62
End: 2022-05-30
Payer: COMMERCIAL

## 2022-12-27 ENCOUNTER — PATIENT OUTREACH (OUTPATIENT)
Dept: ADMINISTRATIVE | Facility: HOSPITAL | Age: 62
End: 2022-12-27
Payer: COMMERCIAL

## 2022-12-29 ENCOUNTER — OFFICE VISIT (OUTPATIENT)
Dept: INTERNAL MEDICINE | Facility: CLINIC | Age: 62
End: 2022-12-29
Payer: COMMERCIAL

## 2022-12-29 ENCOUNTER — LAB VISIT (OUTPATIENT)
Dept: LAB | Facility: HOSPITAL | Age: 62
End: 2022-12-29
Attending: INTERNAL MEDICINE
Payer: COMMERCIAL

## 2022-12-29 ENCOUNTER — DOCUMENTATION ONLY (OUTPATIENT)
Dept: INTERNAL MEDICINE | Facility: CLINIC | Age: 62
End: 2022-12-29

## 2022-12-29 VITALS
HEART RATE: 72 BPM | HEIGHT: 70 IN | DIASTOLIC BLOOD PRESSURE: 74 MMHG | WEIGHT: 186.94 LBS | OXYGEN SATURATION: 98 % | BODY MASS INDEX: 26.76 KG/M2 | SYSTOLIC BLOOD PRESSURE: 102 MMHG

## 2022-12-29 DIAGNOSIS — M16.0 BILATERAL HIP JOINT ARTHRITIS: ICD-10-CM

## 2022-12-29 DIAGNOSIS — Z00.00 ROUTINE PHYSICAL EXAMINATION: Primary | ICD-10-CM

## 2022-12-29 DIAGNOSIS — Z12.11 ENCOUNTER FOR FIT (FECAL IMMUNOCHEMICAL TEST) SCREENING: ICD-10-CM

## 2022-12-29 DIAGNOSIS — Z12.5 SCREENING FOR PROSTATE CANCER: ICD-10-CM

## 2022-12-29 DIAGNOSIS — Z00.00 ROUTINE PHYSICAL EXAMINATION: ICD-10-CM

## 2022-12-29 LAB
ALBUMIN SERPL BCP-MCNC: 4 G/DL (ref 3.5–5.2)
ALP SERPL-CCNC: 46 U/L (ref 55–135)
ALT SERPL W/O P-5'-P-CCNC: 16 U/L (ref 10–44)
ANION GAP SERPL CALC-SCNC: 8 MMOL/L (ref 8–16)
AST SERPL-CCNC: 20 U/L (ref 10–40)
BASOPHILS # BLD AUTO: 0.03 K/UL (ref 0–0.2)
BASOPHILS NFR BLD: 0.6 % (ref 0–1.9)
BILIRUB SERPL-MCNC: 1 MG/DL (ref 0.1–1)
BUN SERPL-MCNC: 20 MG/DL (ref 8–23)
CALCIUM SERPL-MCNC: 8.9 MG/DL (ref 8.7–10.5)
CHLORIDE SERPL-SCNC: 104 MMOL/L (ref 95–110)
CHOLEST SERPL-MCNC: 198 MG/DL (ref 120–199)
CHOLEST/HDLC SERPL: 2.6 {RATIO} (ref 2–5)
CO2 SERPL-SCNC: 26 MMOL/L (ref 23–29)
COMPLEXED PSA SERPL-MCNC: 2.6 NG/ML (ref 0–4)
CREAT SERPL-MCNC: 1.1 MG/DL (ref 0.5–1.4)
DIFFERENTIAL METHOD: ABNORMAL
EOSINOPHIL # BLD AUTO: 0.1 K/UL (ref 0–0.5)
EOSINOPHIL NFR BLD: 1.9 % (ref 0–8)
ERYTHROCYTE [DISTWIDTH] IN BLOOD BY AUTOMATED COUNT: 13.4 % (ref 11.5–14.5)
EST. GFR  (NO RACE VARIABLE): >60 ML/MIN/1.73 M^2
ESTIMATED AVG GLUCOSE: 105 MG/DL (ref 68–131)
GLUCOSE SERPL-MCNC: 97 MG/DL (ref 70–110)
HBA1C MFR BLD: 5.3 % (ref 4–5.6)
HCT VFR BLD AUTO: 44.1 % (ref 40–54)
HDLC SERPL-MCNC: 75 MG/DL (ref 40–75)
HDLC SERPL: 37.9 % (ref 20–50)
HGB BLD-MCNC: 14.6 G/DL (ref 14–18)
IMM GRANULOCYTES # BLD AUTO: 0.01 K/UL (ref 0–0.04)
IMM GRANULOCYTES NFR BLD AUTO: 0.2 % (ref 0–0.5)
LDLC SERPL CALC-MCNC: 110.2 MG/DL (ref 63–159)
LYMPHOCYTES # BLD AUTO: 1.6 K/UL (ref 1–4.8)
LYMPHOCYTES NFR BLD: 30.4 % (ref 18–48)
MCH RBC QN AUTO: 31.2 PG (ref 27–31)
MCHC RBC AUTO-ENTMCNC: 33.1 G/DL (ref 32–36)
MCV RBC AUTO: 94 FL (ref 82–98)
MONOCYTES # BLD AUTO: 0.4 K/UL (ref 0.3–1)
MONOCYTES NFR BLD: 7.8 % (ref 4–15)
NEUTROPHILS # BLD AUTO: 3.1 K/UL (ref 1.8–7.7)
NEUTROPHILS NFR BLD: 59.1 % (ref 38–73)
NONHDLC SERPL-MCNC: 123 MG/DL
NRBC BLD-RTO: 0 /100 WBC
PLATELET # BLD AUTO: 222 K/UL (ref 150–450)
PMV BLD AUTO: 11.5 FL (ref 9.2–12.9)
POTASSIUM SERPL-SCNC: 4.4 MMOL/L (ref 3.5–5.1)
PROT SERPL-MCNC: 6.9 G/DL (ref 6–8.4)
RBC # BLD AUTO: 4.68 M/UL (ref 4.6–6.2)
SODIUM SERPL-SCNC: 138 MMOL/L (ref 136–145)
TRIGL SERPL-MCNC: 64 MG/DL (ref 30–150)
WBC # BLD AUTO: 5.23 K/UL (ref 3.9–12.7)

## 2022-12-29 PROCEDURE — 99396 PREV VISIT EST AGE 40-64: CPT | Mod: S$GLB,,, | Performed by: INTERNAL MEDICINE

## 2022-12-29 PROCEDURE — 36415 COLL VENOUS BLD VENIPUNCTURE: CPT | Performed by: INTERNAL MEDICINE

## 2022-12-29 PROCEDURE — 99999 PR PBB SHADOW E&M-EST. PATIENT-LVL III: ICD-10-PCS | Mod: PBBFAC,,, | Performed by: INTERNAL MEDICINE

## 2022-12-29 PROCEDURE — 80061 LIPID PANEL: CPT | Performed by: INTERNAL MEDICINE

## 2022-12-29 PROCEDURE — 1159F PR MEDICATION LIST DOCUMENTED IN MEDICAL RECORD: ICD-10-PCS | Mod: CPTII,S$GLB,, | Performed by: INTERNAL MEDICINE

## 2022-12-29 PROCEDURE — 3074F SYST BP LT 130 MM HG: CPT | Mod: CPTII,S$GLB,, | Performed by: INTERNAL MEDICINE

## 2022-12-29 PROCEDURE — 80053 COMPREHEN METABOLIC PANEL: CPT | Performed by: INTERNAL MEDICINE

## 2022-12-29 PROCEDURE — 1160F RVW MEDS BY RX/DR IN RCRD: CPT | Mod: CPTII,S$GLB,, | Performed by: INTERNAL MEDICINE

## 2022-12-29 PROCEDURE — 3074F PR MOST RECENT SYSTOLIC BLOOD PRESSURE < 130 MM HG: ICD-10-PCS | Mod: CPTII,S$GLB,, | Performed by: INTERNAL MEDICINE

## 2022-12-29 PROCEDURE — 3008F PR BODY MASS INDEX (BMI) DOCUMENTED: ICD-10-PCS | Mod: CPTII,S$GLB,, | Performed by: INTERNAL MEDICINE

## 2022-12-29 PROCEDURE — 85025 COMPLETE CBC W/AUTO DIFF WBC: CPT | Performed by: INTERNAL MEDICINE

## 2022-12-29 PROCEDURE — 84153 ASSAY OF PSA TOTAL: CPT | Performed by: INTERNAL MEDICINE

## 2022-12-29 PROCEDURE — 99396 PR PREVENTIVE VISIT,EST,40-64: ICD-10-PCS | Mod: S$GLB,,, | Performed by: INTERNAL MEDICINE

## 2022-12-29 PROCEDURE — 3078F DIAST BP <80 MM HG: CPT | Mod: CPTII,S$GLB,, | Performed by: INTERNAL MEDICINE

## 2022-12-29 PROCEDURE — 1159F MED LIST DOCD IN RCRD: CPT | Mod: CPTII,S$GLB,, | Performed by: INTERNAL MEDICINE

## 2022-12-29 PROCEDURE — 3078F PR MOST RECENT DIASTOLIC BLOOD PRESSURE < 80 MM HG: ICD-10-PCS | Mod: CPTII,S$GLB,, | Performed by: INTERNAL MEDICINE

## 2022-12-29 PROCEDURE — 3008F BODY MASS INDEX DOCD: CPT | Mod: CPTII,S$GLB,, | Performed by: INTERNAL MEDICINE

## 2022-12-29 PROCEDURE — 1160F PR REVIEW ALL MEDS BY PRESCRIBER/CLIN PHARMACIST DOCUMENTED: ICD-10-PCS | Mod: CPTII,S$GLB,, | Performed by: INTERNAL MEDICINE

## 2022-12-29 PROCEDURE — 83036 HEMOGLOBIN GLYCOSYLATED A1C: CPT | Performed by: INTERNAL MEDICINE

## 2022-12-29 PROCEDURE — 99999 PR PBB SHADOW E&M-EST. PATIENT-LVL III: CPT | Mod: PBBFAC,,, | Performed by: INTERNAL MEDICINE

## 2022-12-29 NOTE — PROGRESS NOTES
Subjective:       Patient ID: David Diamond is a 62 y.o. male.    Chief Complaint: Annual Exam    HPI  here for annual exam.  Since our last visit he had both hips replaced  Is going well.  Since 2019 he intentionally lost over 80 lb.  He feels better has better energy and less pain.  He did not do his fit kit last time so we will give him a new when and I would like to update labs including lipids, chemistry, blood count and A1c.    No change in family history.  He sees Dermatology periodically.    Review of Systems   Constitutional:  Negative for activity change and unexpected weight change.   HENT:  Negative for hearing loss, rhinorrhea and trouble swallowing.    Eyes:  Negative for discharge and visual disturbance.   Respiratory:  Negative for chest tightness and wheezing.    Cardiovascular:  Negative for chest pain and palpitations.   Gastrointestinal:  Negative for blood in stool, constipation, diarrhea and vomiting.   Endocrine: Negative for polydipsia and polyuria.   Genitourinary:  Negative for difficulty urinating, hematuria and urgency.   Musculoskeletal:  Negative for arthralgias, joint swelling and neck pain.   Neurological:  Negative for weakness and headaches.   Psychiatric/Behavioral:  Negative for confusion and dysphoric mood.          Past Medical History:   Diagnosis Date    Squamous cell carcinoma      Past Surgical History:   Procedure Laterality Date    CHOLECYSTECTOMY      HIP ARTHROPLASTY Left     Tonsils        Patient Active Problem List   Diagnosis    Lipoma        Objective:      Physical Exam  Constitutional:       General: He is not in acute distress.     Appearance: He is well-developed.      Comments: Significant improvement in his weight   HENT:      Head: Normocephalic and atraumatic.      Right Ear: Tympanic membrane, ear canal and external ear normal.      Left Ear: Tympanic membrane, ear canal and external ear normal.      Mouth/Throat:      Pharynx: No oropharyngeal exudate or  posterior oropharyngeal erythema.   Eyes:      General: No scleral icterus.     Conjunctiva/sclera: Conjunctivae normal.      Pupils: Pupils are equal, round, and reactive to light.   Neck:      Thyroid: No thyromegaly.      Comments: No supraclavicular nodes palpated  Cardiovascular:      Rate and Rhythm: Normal rate and regular rhythm.      Pulses: Normal pulses.      Heart sounds: Normal heart sounds. No murmur heard.  Pulmonary:      Effort: Pulmonary effort is normal.      Breath sounds: Normal breath sounds. No wheezing.   Abdominal:      General: Bowel sounds are normal.      Palpations: Abdomen is soft. There is no mass.      Tenderness: There is no abdominal tenderness.   Genitourinary:     Comments: Pt deferred  Musculoskeletal:         General: No tenderness.      Cervical back: Normal range of motion and neck supple.      Right lower leg: No edema.      Left lower leg: No edema.   Lymphadenopathy:      Cervical: No cervical adenopathy.   Skin:     Coloration: Skin is not jaundiced or pale.   Neurological:      General: No focal deficit present.      Mental Status: He is alert and oriented to person, place, and time.   Psychiatric:         Mood and Affect: Mood normal.         Behavior: Behavior normal.       Assessment:       Problem List Items Addressed This Visit    None  Visit Diagnoses       Routine physical examination    -  Primary    Relevant Orders    CBC Auto Differential    Lipid Panel    PSA, Screening    Hemoglobin A1C    Comprehensive Metabolic Panel    Fecal Immunochemical Test (iFOBT)    Bilateral hip joint arthritis        Screening for prostate cancer        Relevant Orders    PSA, Screening    Encounter for FIT (fecal immunochemical test) screening        Relevant Orders    Fecal Immunochemical Test (iFOBT)              Plan:         David FANG was seen today for annual exam.    Diagnoses and all orders for this visit:    Routine physical examination  -     CBC Auto Differential; Future  -   "   Lipid Panel; Future  -     PSA, Screening; Future  -     Hemoglobin A1C; Future  -     Comprehensive Metabolic Panel; Future  -     Fecal Immunochemical Test (iFOBT); Future    Bilateral hip joint arthritis    Screening for prostate cancer  -     PSA, Screening; Future    Encounter for FIT (fecal immunochemical test) screening  -     Fecal Immunochemical Test (iFOBT); Future             Review all studies.  Follow-up annually          Portions of this note may have been created with voice recognition software. Occasional "wrong-word" or "sound-a-like" substitutions may have occurred due to the inherent limitations of voice recognition software. Please, read the note carefully and recognize, using context, where substitutions have occurred.  "

## 2023-01-02 ENCOUNTER — LAB VISIT (OUTPATIENT)
Dept: LAB | Facility: HOSPITAL | Age: 63
End: 2023-01-02
Attending: INTERNAL MEDICINE
Payer: COMMERCIAL

## 2023-01-02 DIAGNOSIS — Z00.00 ROUTINE PHYSICAL EXAMINATION: ICD-10-CM

## 2023-01-02 DIAGNOSIS — Z12.11 ENCOUNTER FOR FIT (FECAL IMMUNOCHEMICAL TEST) SCREENING: ICD-10-CM

## 2023-01-02 PROCEDURE — 82274 ASSAY TEST FOR BLOOD FECAL: CPT | Performed by: INTERNAL MEDICINE

## 2023-01-03 ENCOUNTER — PATIENT MESSAGE (OUTPATIENT)
Dept: INTERNAL MEDICINE | Facility: CLINIC | Age: 63
End: 2023-01-03
Payer: COMMERCIAL

## 2023-01-10 ENCOUNTER — PATIENT MESSAGE (OUTPATIENT)
Dept: INTERNAL MEDICINE | Facility: CLINIC | Age: 63
End: 2023-01-10
Payer: COMMERCIAL

## 2023-01-10 LAB — HEMOCCULT STL QL IA: NEGATIVE

## 2023-02-17 ENCOUNTER — PATIENT MESSAGE (OUTPATIENT)
Dept: INTERNAL MEDICINE | Facility: CLINIC | Age: 63
End: 2023-02-17
Payer: COMMERCIAL

## 2023-03-01 ENCOUNTER — PATIENT MESSAGE (OUTPATIENT)
Dept: INTERNAL MEDICINE | Facility: CLINIC | Age: 63
End: 2023-03-01
Payer: COMMERCIAL

## 2023-06-23 ENCOUNTER — OFFICE VISIT (OUTPATIENT)
Dept: DERMATOLOGY | Facility: CLINIC | Age: 63
End: 2023-06-23
Payer: COMMERCIAL

## 2023-06-23 VITALS — WEIGHT: 186 LBS | BODY MASS INDEX: 26.69 KG/M2

## 2023-06-23 DIAGNOSIS — L81.4 LENTIGINES: ICD-10-CM

## 2023-06-23 DIAGNOSIS — L57.0 ACTINIC KERATOSIS: Primary | ICD-10-CM

## 2023-06-23 DIAGNOSIS — D18.01 CHERRY ANGIOMA: ICD-10-CM

## 2023-06-23 DIAGNOSIS — L82.1 SEBORRHEIC KERATOSES: ICD-10-CM

## 2023-06-23 DIAGNOSIS — D22.9 NEVUS OF MULTIPLE SITES: ICD-10-CM

## 2023-06-23 DIAGNOSIS — L30.9 ECZEMA OF BOTH HANDS: ICD-10-CM

## 2023-06-23 DIAGNOSIS — Z85.828 HISTORY OF SKIN CANCER: ICD-10-CM

## 2023-06-23 PROCEDURE — 1101F PT FALLS ASSESS-DOCD LE1/YR: CPT | Mod: CPTII,S$GLB,, | Performed by: DERMATOLOGY

## 2023-06-23 PROCEDURE — 3008F PR BODY MASS INDEX (BMI) DOCUMENTED: ICD-10-PCS | Mod: CPTII,S$GLB,, | Performed by: DERMATOLOGY

## 2023-06-23 PROCEDURE — 99999 PR PBB SHADOW E&M-EST. PATIENT-LVL III: CPT | Mod: PBBFAC,,, | Performed by: DERMATOLOGY

## 2023-06-23 PROCEDURE — 1159F PR MEDICATION LIST DOCUMENTED IN MEDICAL RECORD: ICD-10-PCS | Mod: CPTII,S$GLB,, | Performed by: DERMATOLOGY

## 2023-06-23 PROCEDURE — 3288F FALL RISK ASSESSMENT DOCD: CPT | Mod: CPTII,S$GLB,, | Performed by: DERMATOLOGY

## 2023-06-23 PROCEDURE — 99214 PR OFFICE/OUTPT VISIT, EST, LEVL IV, 30-39 MIN: ICD-10-PCS | Mod: 25,S$GLB,, | Performed by: DERMATOLOGY

## 2023-06-23 PROCEDURE — 99999 PR PBB SHADOW E&M-EST. PATIENT-LVL III: ICD-10-PCS | Mod: PBBFAC,,, | Performed by: DERMATOLOGY

## 2023-06-23 PROCEDURE — 1160F PR REVIEW ALL MEDS BY PRESCRIBER/CLIN PHARMACIST DOCUMENTED: ICD-10-PCS | Mod: CPTII,S$GLB,, | Performed by: DERMATOLOGY

## 2023-06-23 PROCEDURE — 17000 DESTRUCT PREMALG LESION: CPT | Mod: S$GLB,,, | Performed by: DERMATOLOGY

## 2023-06-23 PROCEDURE — 17000 PR DESTRUCTION(LASER SURGERY,CRYOSURGERY,CHEMOSURGERY),PREMALIGNANT LESIONS,FIRST LESION: ICD-10-PCS | Mod: S$GLB,,, | Performed by: DERMATOLOGY

## 2023-06-23 PROCEDURE — 99214 OFFICE O/P EST MOD 30 MIN: CPT | Mod: 25,S$GLB,, | Performed by: DERMATOLOGY

## 2023-06-23 PROCEDURE — 3288F PR FALLS RISK ASSESSMENT DOCUMENTED: ICD-10-PCS | Mod: CPTII,S$GLB,, | Performed by: DERMATOLOGY

## 2023-06-23 PROCEDURE — 3008F BODY MASS INDEX DOCD: CPT | Mod: CPTII,S$GLB,, | Performed by: DERMATOLOGY

## 2023-06-23 PROCEDURE — 1160F RVW MEDS BY RX/DR IN RCRD: CPT | Mod: CPTII,S$GLB,, | Performed by: DERMATOLOGY

## 2023-06-23 PROCEDURE — 1101F PR PT FALLS ASSESS DOC 0-1 FALLS W/OUT INJ PAST YR: ICD-10-PCS | Mod: CPTII,S$GLB,, | Performed by: DERMATOLOGY

## 2023-06-23 PROCEDURE — 1159F MED LIST DOCD IN RCRD: CPT | Mod: CPTII,S$GLB,, | Performed by: DERMATOLOGY

## 2023-06-23 NOTE — PROGRESS NOTES
Subjective:      Patient ID:  David Diamond is a 63 y.o. male who presents for   Chief Complaint   Patient presents with    Skin Check     UBSE     New spots on face and left ear.  He used efudex cream on his scalp last year and it worked well. Also has eczema on dorsal hands from a cleaning product.       Review of Systems   Constitutional:  Negative for fever, chills, weight loss, weight gain, fatigue, night sweats and malaise.   Skin:  Negative for wears hat.   Hematologic/Lymphatic: Does not bruise/bleed easily.     Objective:   Physical Exam   Constitutional: He appears well-developed and well-nourished. No distress.   Neurological: He is alert and oriented to person, place, and time. He is not disoriented.   Psychiatric: He has a normal mood and affect.   Skin:   Areas Examined (abnormalities noted in diagram):   Scalp / Hair Palpated and Inspected  Head / Face Inspection Performed  Neck Inspection Performed  Chest / Axilla Inspection Performed  Abdomen Inspection Performed  Back Inspection Performed  RUE Inspected  LUE Inspection Performed  Nails and Digits Inspection Performed                   Diagram Legend     Erythematous scaling macule/papule c/w actinic keratosis       Vascular papule c/w angioma      Pigmented verrucoid papule/plaque c/w seborrheic keratosis      Yellow umbilicated papule c/w sebaceous hyperplasia      Irregularly shaped tan macule c/w lentigo     1-2 mm smooth white papules consistent with Milia      Movable subcutaneous cyst with punctum c/w epidermal inclusion cyst      Subcutaneous movable cyst c/w pilar cyst      Firm pink to brown papule c/w dermatofibroma      Pedunculated fleshy papule(s) c/w skin tag(s)      Evenly pigmented macule c/w junctional nevus     Mildly variegated pigmented, slightly irregular-bordered macule c/w mildly atypical nevus      Flesh colored to evenly pigmented papule c/w intradermal nevus       Pink pearly papule/plaque c/w basal cell carcinoma       "Erythematous hyperkeratotic cursted plaque c/w SCC      Surgical scar with no sign of skin cancer recurrence      Open and closed comedones      Inflammatory papules and pustules      Verrucoid papule consistent consistent with wart     Erythematous eczematous patches and plaques     Dystrophic onycholytic nail with subungual debris c/w onychomycosis     Umbilicated papule    Erythematous-base heme-crusted tan verrucoid plaque consistent with inflamed seborrheic keratosis     Erythematous Silvery Scaling Plaque c/w Psoriasis     See annotation      Assessment / Plan:        Actinic keratosis   Cryosurgery Procedure Note    Verbal consent from the patient is obtained and the patient is aware of the precancerous quality and need for treatment of these lesions. Liquid nitrogen cryosurgery is applied to the 1 actinic keratoses, as detailed in the physical exam, to produce a freeze injury.      History of skin cancer  Comments:  sccis scalp removed mohs surgery  Area(s) of previous NMSC evaluated with no signs of recurrence.    Upper body skin examination performed today including at least 6 points as noted in physical examination. No lesions suspicious for malignancy noted.    Recommend daily sun protection/avoidance and use of at least SPF 30, broad spectrum sunscreen (OTC drug).       Seborrheic keratoses  Seborrheic keratosis scattered, told benign no treatment needed.  Brochure provided.      Lentigines  The "ABCD" rules to observe pigmented lesions were reviewed.      Gleason angiomas  This is a benign vascular lesion. Reassurance given. No treatment required.       Nevus of multiple sites  The "ABCD" rules to observe pigmented lesions were reviewed.      Eczema of both hands  Aveeno eczema cream otc             Follow up in about 6 months (around 12/23/2023).  "

## 2023-08-11 DIAGNOSIS — L57.0 MULTIPLE ACTINIC KERATOSES: ICD-10-CM

## 2023-08-11 RX ORDER — FLUOROURACIL 50 MG/G
CREAM TOPICAL
Qty: 40 G | Refills: 3 | Status: SHIPPED | OUTPATIENT
Start: 2023-08-11

## 2023-11-19 NOTE — PROGRESS NOTES
Subjective:       Patient ID: David Diamond is a 63 y.o. male.    Chief Complaint: Annual Exam    HPI:  63-year-old male here for annual exam.  Overall says he is doing well.  Trying to stay healthy, takes a lot of supplements that his son and grandson help him with.  He says they are both into nutrition.  The patient lost a bunch of weight a few years ago and has kept it off through diet and exercise.  He takes a significant amount of extra vitamin-D and vitamin K. He is now concerned that he is possibly taking too much.  I explained that those or fat soluble but that most patients were low in vitamin-D.  K, I  was not so sure about.  He has no obvious symptoms of elevated vitamin levels but I have asked him to send me a copy of the strength or dosage he is taking and we will update some blood work.        He is due for an abdominal aorta screening.  He will be due for a fit kit in the coming weeks.  He declines colonoscopy at this time.  He also defers rectal exam.      Review of Systems   Constitutional:  Negative for chills, fatigue and fever.   HENT:  Negative for nosebleeds and trouble swallowing.    Eyes:  Negative for pain and visual disturbance.   Respiratory:  Negative for cough, shortness of breath and wheezing.    Cardiovascular:  Negative for chest pain and palpitations.   Gastrointestinal:  Negative for abdominal pain, constipation, diarrhea, nausea and vomiting.   Genitourinary:  Negative for difficulty urinating and hematuria.   Musculoskeletal:  Positive for arthralgias (Bilateral hip surgery. He does take antibiotic prophylaxis). Negative for back pain and neck pain.   Integumentary:  Negative for rash.   Neurological:  Negative for dizziness and headaches.   Hematological:  Does not bruise/bleed easily.   Psychiatric/Behavioral:  Negative for dysphoric mood and sleep disturbance.            Past Medical History:   Diagnosis Date    Squamous cell carcinoma      Past Surgical History:   Procedure  Laterality Date    CHOLECYSTECTOMY      HIP ARTHROPLASTY Left     Tonsils        Patient Active Problem List   Diagnosis    Lipoma        Objective:      Physical Exam  Constitutional:       General: He is not in acute distress.     Appearance: He is well-developed.   HENT:      Head: Normocephalic and atraumatic.      Right Ear: Tympanic membrane, ear canal and external ear normal.      Left Ear: Tympanic membrane, ear canal and external ear normal.      Mouth/Throat:      Pharynx: No oropharyngeal exudate or posterior oropharyngeal erythema.   Eyes:      General: No scleral icterus.     Conjunctiva/sclera: Conjunctivae normal.      Pupils: Pupils are equal, round, and reactive to light.   Neck:      Thyroid: No thyromegaly.      Comments: No supraclavicular nodes palpated  Cardiovascular:      Rate and Rhythm: Normal rate and regular rhythm.      Pulses: Normal pulses.      Heart sounds: Normal heart sounds. No murmur heard.  Pulmonary:      Effort: Pulmonary effort is normal.      Breath sounds: Normal breath sounds. No wheezing.   Abdominal:      General: Bowel sounds are normal.      Palpations: Abdomen is soft. There is no mass.      Tenderness: There is no abdominal tenderness.   Genitourinary:     Comments: Pt deferred  Musculoskeletal:         General: No tenderness.      Cervical back: Normal range of motion and neck supple.      Right lower leg: No edema.      Left lower leg: No edema.   Lymphadenopathy:      Cervical: No cervical adenopathy.   Skin:     Coloration: Skin is not jaundiced or pale.   Neurological:      General: No focal deficit present.      Mental Status: He is alert and oriented to person, place, and time.   Psychiatric:         Mood and Affect: Mood normal.         Behavior: Behavior normal.         Assessment:       Problem List Items Addressed This Visit          Oncology    Lipoma     Other Visit Diagnoses       Routine physical examination    -  Primary    Relevant Orders    VAS US  "Abdominal Aorta    Lipid Panel    PSA, Screening    TSH    CBC Auto Differential    Comprehensive Metabolic Panel    Hemoglobin A1C    Fecal Immunochemical Test (iFOBT)    VITAMIN K 1    Vitamin D    Encounter for FIT (fecal immunochemical test) screening        Relevant Orders    Fecal Immunochemical Test (iFOBT)    Screening for AAA (aortic abdominal aneurysm)        Relevant Orders    VAS US Abdominal Aorta    Screening for prostate cancer        Relevant Orders    PSA, Screening            Plan:         David FANG was seen today for annual exam.    Diagnoses and all orders for this visit:    Routine physical examination  -     VAS US Abdominal Aorta; Future  -     Lipid Panel; Future  -     PSA, Screening; Future  -     TSH; Future  -     CBC Auto Differential; Future  -     Comprehensive Metabolic Panel; Future  -     Hemoglobin A1C; Future  -     Fecal Immunochemical Test (iFOBT); Future  -     VITAMIN K 1; Future  -     Vitamin D; Future    Lipoma, unspecified site    Encounter for FIT (fecal immunochemical test) screening  -     Fecal Immunochemical Test (iFOBT); Future    Screening for AAA (aortic abdominal aneurysm)  -     VAS US Abdominal Aorta; Future    Screening for prostate cancer  -     PSA, Screening; Future       Review all studies.  Continue to maintain a healthy diet and weight.  Send me a copy of strength of the extra vitamin D and K              Portions of this note may have been created with voice recognition software. Occasional "wrong-word" or "sound-a-like" substitutions may have occurred due to the inherent limitations of voice recognition software. Please, read the note carefully and recognize, using context, where substitutions have occurred.  "

## 2023-11-24 ENCOUNTER — OFFICE VISIT (OUTPATIENT)
Dept: INTERNAL MEDICINE | Facility: CLINIC | Age: 63
End: 2023-11-24
Payer: COMMERCIAL

## 2023-11-24 ENCOUNTER — PATIENT MESSAGE (OUTPATIENT)
Dept: INTERNAL MEDICINE | Facility: CLINIC | Age: 63
End: 2023-11-24

## 2023-11-24 ENCOUNTER — LAB VISIT (OUTPATIENT)
Dept: LAB | Facility: HOSPITAL | Age: 63
End: 2023-11-24
Attending: INTERNAL MEDICINE
Payer: COMMERCIAL

## 2023-11-24 VITALS
DIASTOLIC BLOOD PRESSURE: 74 MMHG | HEART RATE: 56 BPM | HEIGHT: 70 IN | SYSTOLIC BLOOD PRESSURE: 102 MMHG | OXYGEN SATURATION: 97 % | WEIGHT: 194.25 LBS | BODY MASS INDEX: 27.81 KG/M2

## 2023-11-24 DIAGNOSIS — Z12.5 SCREENING FOR PROSTATE CANCER: ICD-10-CM

## 2023-11-24 DIAGNOSIS — Z12.11 ENCOUNTER FOR FIT (FECAL IMMUNOCHEMICAL TEST) SCREENING: ICD-10-CM

## 2023-11-24 DIAGNOSIS — Z00.00 ROUTINE PHYSICAL EXAMINATION: ICD-10-CM

## 2023-11-24 DIAGNOSIS — Z13.6 SCREENING FOR AAA (AORTIC ABDOMINAL ANEURYSM): ICD-10-CM

## 2023-11-24 DIAGNOSIS — Z00.00 ROUTINE PHYSICAL EXAMINATION: Primary | ICD-10-CM

## 2023-11-24 DIAGNOSIS — D17.9 LIPOMA, UNSPECIFIED SITE: ICD-10-CM

## 2023-11-24 LAB
25(OH)D3+25(OH)D2 SERPL-MCNC: 67 NG/ML (ref 30–96)
ALBUMIN SERPL BCP-MCNC: 3.8 G/DL (ref 3.5–5.2)
ALP SERPL-CCNC: 45 U/L (ref 55–135)
ALT SERPL W/O P-5'-P-CCNC: 20 U/L (ref 10–44)
ANION GAP SERPL CALC-SCNC: 13 MMOL/L (ref 8–16)
AST SERPL-CCNC: 24 U/L (ref 10–40)
BASOPHILS # BLD AUTO: 0.04 K/UL (ref 0–0.2)
BASOPHILS NFR BLD: 0.8 % (ref 0–1.9)
BILIRUB SERPL-MCNC: 0.5 MG/DL (ref 0.1–1)
BUN SERPL-MCNC: 24 MG/DL (ref 8–23)
CALCIUM SERPL-MCNC: 9.5 MG/DL (ref 8.7–10.5)
CHLORIDE SERPL-SCNC: 104 MMOL/L (ref 95–110)
CHOLEST SERPL-MCNC: 198 MG/DL (ref 120–199)
CHOLEST/HDLC SERPL: 2.3 {RATIO} (ref 2–5)
CO2 SERPL-SCNC: 24 MMOL/L (ref 23–29)
COMPLEXED PSA SERPL-MCNC: 2.7 NG/ML (ref 0–4)
CREAT SERPL-MCNC: 1.1 MG/DL (ref 0.5–1.4)
DIFFERENTIAL METHOD: ABNORMAL
EOSINOPHIL # BLD AUTO: 0.1 K/UL (ref 0–0.5)
EOSINOPHIL NFR BLD: 1.8 % (ref 0–8)
ERYTHROCYTE [DISTWIDTH] IN BLOOD BY AUTOMATED COUNT: 13.7 % (ref 11.5–14.5)
EST. GFR  (NO RACE VARIABLE): >60 ML/MIN/1.73 M^2
ESTIMATED AVG GLUCOSE: 103 MG/DL (ref 68–131)
GLUCOSE SERPL-MCNC: 105 MG/DL (ref 70–110)
HBA1C MFR BLD: 5.2 % (ref 4–5.6)
HCT VFR BLD AUTO: 43.9 % (ref 40–54)
HDLC SERPL-MCNC: 86 MG/DL (ref 40–75)
HDLC SERPL: 43.4 % (ref 20–50)
HGB BLD-MCNC: 14.7 G/DL (ref 14–18)
IMM GRANULOCYTES # BLD AUTO: 0 K/UL (ref 0–0.04)
IMM GRANULOCYTES NFR BLD AUTO: 0 % (ref 0–0.5)
LDLC SERPL CALC-MCNC: 103.6 MG/DL (ref 63–159)
LYMPHOCYTES # BLD AUTO: 2 K/UL (ref 1–4.8)
LYMPHOCYTES NFR BLD: 39.6 % (ref 18–48)
MCH RBC QN AUTO: 31.3 PG (ref 27–31)
MCHC RBC AUTO-ENTMCNC: 33.5 G/DL (ref 32–36)
MCV RBC AUTO: 94 FL (ref 82–98)
MONOCYTES # BLD AUTO: 0.4 K/UL (ref 0.3–1)
MONOCYTES NFR BLD: 8.4 % (ref 4–15)
NEUTROPHILS # BLD AUTO: 2.5 K/UL (ref 1.8–7.7)
NEUTROPHILS NFR BLD: 49.4 % (ref 38–73)
NONHDLC SERPL-MCNC: 112 MG/DL
NRBC BLD-RTO: 0 /100 WBC
PLATELET # BLD AUTO: 178 K/UL (ref 150–450)
PMV BLD AUTO: 10.9 FL (ref 9.2–12.9)
POTASSIUM SERPL-SCNC: 4.5 MMOL/L (ref 3.5–5.1)
PROT SERPL-MCNC: 7 G/DL (ref 6–8.4)
RBC # BLD AUTO: 4.69 M/UL (ref 4.6–6.2)
SODIUM SERPL-SCNC: 141 MMOL/L (ref 136–145)
TRIGL SERPL-MCNC: 42 MG/DL (ref 30–150)
TSH SERPL DL<=0.005 MIU/L-ACNC: 1.6 UIU/ML (ref 0.4–4)
WBC # BLD AUTO: 5.1 K/UL (ref 3.9–12.7)

## 2023-11-24 PROCEDURE — 82306 VITAMIN D 25 HYDROXY: CPT | Performed by: INTERNAL MEDICINE

## 2023-11-24 PROCEDURE — 3074F PR MOST RECENT SYSTOLIC BLOOD PRESSURE < 130 MM HG: ICD-10-PCS | Mod: CPTII,S$GLB,, | Performed by: INTERNAL MEDICINE

## 2023-11-24 PROCEDURE — 99999 PR PBB SHADOW E&M-EST. PATIENT-LVL IV: ICD-10-PCS | Mod: PBBFAC,,, | Performed by: INTERNAL MEDICINE

## 2023-11-24 PROCEDURE — 1159F MED LIST DOCD IN RCRD: CPT | Mod: CPTII,S$GLB,, | Performed by: INTERNAL MEDICINE

## 2023-11-24 PROCEDURE — 99397 PR PREVENTIVE VISIT,EST,65 & OVER: ICD-10-PCS | Mod: S$GLB,,, | Performed by: INTERNAL MEDICINE

## 2023-11-24 PROCEDURE — 84153 ASSAY OF PSA TOTAL: CPT | Performed by: INTERNAL MEDICINE

## 2023-11-24 PROCEDURE — 84597 ASSAY OF VITAMIN K: CPT | Performed by: INTERNAL MEDICINE

## 2023-11-24 PROCEDURE — 1160F RVW MEDS BY RX/DR IN RCRD: CPT | Mod: CPTII,S$GLB,, | Performed by: INTERNAL MEDICINE

## 2023-11-24 PROCEDURE — 3288F FALL RISK ASSESSMENT DOCD: CPT | Mod: CPTII,S$GLB,, | Performed by: INTERNAL MEDICINE

## 2023-11-24 PROCEDURE — 3288F PR FALLS RISK ASSESSMENT DOCUMENTED: ICD-10-PCS | Mod: CPTII,S$GLB,, | Performed by: INTERNAL MEDICINE

## 2023-11-24 PROCEDURE — 85025 COMPLETE CBC W/AUTO DIFF WBC: CPT | Performed by: INTERNAL MEDICINE

## 2023-11-24 PROCEDURE — 80053 COMPREHEN METABOLIC PANEL: CPT | Performed by: INTERNAL MEDICINE

## 2023-11-24 PROCEDURE — 36415 COLL VENOUS BLD VENIPUNCTURE: CPT | Performed by: INTERNAL MEDICINE

## 2023-11-24 PROCEDURE — 80061 LIPID PANEL: CPT | Performed by: INTERNAL MEDICINE

## 2023-11-24 PROCEDURE — 83036 HEMOGLOBIN GLYCOSYLATED A1C: CPT | Performed by: INTERNAL MEDICINE

## 2023-11-24 PROCEDURE — 1160F PR REVIEW ALL MEDS BY PRESCRIBER/CLIN PHARMACIST DOCUMENTED: ICD-10-PCS | Mod: CPTII,S$GLB,, | Performed by: INTERNAL MEDICINE

## 2023-11-24 PROCEDURE — 1101F PR PT FALLS ASSESS DOC 0-1 FALLS W/OUT INJ PAST YR: ICD-10-PCS | Mod: CPTII,S$GLB,, | Performed by: INTERNAL MEDICINE

## 2023-11-24 PROCEDURE — 1159F PR MEDICATION LIST DOCUMENTED IN MEDICAL RECORD: ICD-10-PCS | Mod: CPTII,S$GLB,, | Performed by: INTERNAL MEDICINE

## 2023-11-24 PROCEDURE — 84443 ASSAY THYROID STIM HORMONE: CPT | Performed by: INTERNAL MEDICINE

## 2023-11-24 PROCEDURE — 3074F SYST BP LT 130 MM HG: CPT | Mod: CPTII,S$GLB,, | Performed by: INTERNAL MEDICINE

## 2023-11-24 PROCEDURE — 99999 PR PBB SHADOW E&M-EST. PATIENT-LVL IV: CPT | Mod: PBBFAC,,, | Performed by: INTERNAL MEDICINE

## 2023-11-24 PROCEDURE — 3078F DIAST BP <80 MM HG: CPT | Mod: CPTII,S$GLB,, | Performed by: INTERNAL MEDICINE

## 2023-11-24 PROCEDURE — 99397 PER PM REEVAL EST PAT 65+ YR: CPT | Mod: S$GLB,,, | Performed by: INTERNAL MEDICINE

## 2023-11-24 PROCEDURE — 3078F PR MOST RECENT DIASTOLIC BLOOD PRESSURE < 80 MM HG: ICD-10-PCS | Mod: CPTII,S$GLB,, | Performed by: INTERNAL MEDICINE

## 2023-11-24 PROCEDURE — 3008F PR BODY MASS INDEX (BMI) DOCUMENTED: ICD-10-PCS | Mod: CPTII,S$GLB,, | Performed by: INTERNAL MEDICINE

## 2023-11-24 PROCEDURE — 1101F PT FALLS ASSESS-DOCD LE1/YR: CPT | Mod: CPTII,S$GLB,, | Performed by: INTERNAL MEDICINE

## 2023-11-24 PROCEDURE — 3008F BODY MASS INDEX DOCD: CPT | Mod: CPTII,S$GLB,, | Performed by: INTERNAL MEDICINE

## 2023-11-24 RX ORDER — CLINDAMYCIN HYDROCHLORIDE 300 MG/1
CAPSULE ORAL
COMMUNITY
Start: 2023-11-16

## 2023-11-30 LAB — PHYTONADIONE SERPL-MCNC: 1.86 NMOL/L (ref 0.22–4.88)

## 2023-12-01 ENCOUNTER — PATIENT MESSAGE (OUTPATIENT)
Dept: INTERNAL MEDICINE | Facility: CLINIC | Age: 63
End: 2023-12-01
Payer: COMMERCIAL

## 2023-12-13 ENCOUNTER — OFFICE VISIT (OUTPATIENT)
Dept: DERMATOLOGY | Facility: CLINIC | Age: 63
End: 2023-12-13
Payer: COMMERCIAL

## 2023-12-13 VITALS — BODY MASS INDEX: 27.84 KG/M2 | WEIGHT: 194 LBS

## 2023-12-13 DIAGNOSIS — D18.01 CHERRY ANGIOMA: ICD-10-CM

## 2023-12-13 DIAGNOSIS — L30.9 ECZEMA OF BOTH HANDS: ICD-10-CM

## 2023-12-13 DIAGNOSIS — L82.1 SEBORRHEIC KERATOSES: ICD-10-CM

## 2023-12-13 DIAGNOSIS — L57.0 MULTIPLE ACTINIC KERATOSES: Primary | ICD-10-CM

## 2023-12-13 DIAGNOSIS — L81.4 LENTIGINES: ICD-10-CM

## 2023-12-13 DIAGNOSIS — D22.9 NEVUS OF MULTIPLE SITES: ICD-10-CM

## 2023-12-13 DIAGNOSIS — Z85.828 HISTORY OF SKIN CANCER: ICD-10-CM

## 2023-12-13 PROCEDURE — 1159F MED LIST DOCD IN RCRD: CPT | Mod: CPTII,S$GLB,, | Performed by: DERMATOLOGY

## 2023-12-13 PROCEDURE — 1101F PT FALLS ASSESS-DOCD LE1/YR: CPT | Mod: CPTII,S$GLB,, | Performed by: DERMATOLOGY

## 2023-12-13 PROCEDURE — 1160F PR REVIEW ALL MEDS BY PRESCRIBER/CLIN PHARMACIST DOCUMENTED: ICD-10-PCS | Mod: CPTII,S$GLB,, | Performed by: DERMATOLOGY

## 2023-12-13 PROCEDURE — 1160F RVW MEDS BY RX/DR IN RCRD: CPT | Mod: CPTII,S$GLB,, | Performed by: DERMATOLOGY

## 2023-12-13 PROCEDURE — 3288F PR FALLS RISK ASSESSMENT DOCUMENTED: ICD-10-PCS | Mod: CPTII,S$GLB,, | Performed by: DERMATOLOGY

## 2023-12-13 PROCEDURE — 3044F HG A1C LEVEL LT 7.0%: CPT | Mod: CPTII,S$GLB,, | Performed by: DERMATOLOGY

## 2023-12-13 PROCEDURE — 3044F PR MOST RECENT HEMOGLOBIN A1C LEVEL <7.0%: ICD-10-PCS | Mod: CPTII,S$GLB,, | Performed by: DERMATOLOGY

## 2023-12-13 PROCEDURE — 99999 PR PBB SHADOW E&M-EST. PATIENT-LVL II: CPT | Mod: PBBFAC,,, | Performed by: DERMATOLOGY

## 2023-12-13 PROCEDURE — 3288F FALL RISK ASSESSMENT DOCD: CPT | Mod: CPTII,S$GLB,, | Performed by: DERMATOLOGY

## 2023-12-13 PROCEDURE — 3008F PR BODY MASS INDEX (BMI) DOCUMENTED: ICD-10-PCS | Mod: CPTII,S$GLB,, | Performed by: DERMATOLOGY

## 2023-12-13 PROCEDURE — 99214 OFFICE O/P EST MOD 30 MIN: CPT | Mod: S$GLB,,, | Performed by: DERMATOLOGY

## 2023-12-13 PROCEDURE — 1159F PR MEDICATION LIST DOCUMENTED IN MEDICAL RECORD: ICD-10-PCS | Mod: CPTII,S$GLB,, | Performed by: DERMATOLOGY

## 2023-12-13 PROCEDURE — 99214 PR OFFICE/OUTPT VISIT, EST, LEVL IV, 30-39 MIN: ICD-10-PCS | Mod: S$GLB,,, | Performed by: DERMATOLOGY

## 2023-12-13 PROCEDURE — 1101F PR PT FALLS ASSESS DOC 0-1 FALLS W/OUT INJ PAST YR: ICD-10-PCS | Mod: CPTII,S$GLB,, | Performed by: DERMATOLOGY

## 2023-12-13 PROCEDURE — 99999 PR PBB SHADOW E&M-EST. PATIENT-LVL II: ICD-10-PCS | Mod: PBBFAC,,, | Performed by: DERMATOLOGY

## 2023-12-13 PROCEDURE — 3008F BODY MASS INDEX DOCD: CPT | Mod: CPTII,S$GLB,, | Performed by: DERMATOLOGY

## 2023-12-13 NOTE — PROGRESS NOTES
Subjective:      Patient ID:  David Diamond is a 63 y.o. male who presents for   Chief Complaint   Patient presents with    Follow-up     UBSE     Would like skin check, new spots on cheeks and left ear.  Also his hand dermatitis has recurred.     Follow-up - Follow-up  Affected locations: face, left arm, right arm, back and abdomen  Signs / symptoms: asymptomatic      Review of Systems   Constitutional:  Negative for fever, chills, weight loss, weight gain, fatigue, night sweats and malaise.   Skin:  Negative for daily sunscreen use, activity-related sunscreen use and wears hat.   Hematologic/Lymphatic: Does not bruise/bleed easily.       Objective:   Physical Exam   Constitutional: He appears well-developed and well-nourished. No distress.   Neurological: He is alert and oriented to person, place, and time. He is not disoriented.   Psychiatric: He has a normal mood and affect.   Skin:   Areas Examined (abnormalities noted in diagram):   Scalp / Hair Palpated and Inspected  Head / Face Inspection Performed  Neck Inspection Performed  Chest / Axilla Inspection Performed  Abdomen Inspection Performed  Back Inspection Performed  RUE Inspected  LUE Inspection Performed  Nails and Digits Inspection Performed                     Diagram Legend     Erythematous scaling macule/papule c/w actinic keratosis       Vascular papule c/w angioma      Pigmented verrucoid papule/plaque c/w seborrheic keratosis      Yellow umbilicated papule c/w sebaceous hyperplasia      Irregularly shaped tan macule c/w lentigo     1-2 mm smooth white papules consistent with Milia      Movable subcutaneous cyst with punctum c/w epidermal inclusion cyst      Subcutaneous movable cyst c/w pilar cyst      Firm pink to brown papule c/w dermatofibroma      Pedunculated fleshy papule(s) c/w skin tag(s)      Evenly pigmented macule c/w junctional nevus     Mildly variegated pigmented, slightly irregular-bordered macule c/w mildly atypical nevus       "Flesh colored to evenly pigmented papule c/w intradermal nevus       Pink pearly papule/plaque c/w basal cell carcinoma      Erythematous hyperkeratotic cursted plaque c/w SCC      Surgical scar with no sign of skin cancer recurrence      Open and closed comedones      Inflammatory papules and pustules      Verrucoid papule consistent consistent with wart     Erythematous eczematous patches and plaques     Dystrophic onycholytic nail with subungual debris c/w onychomycosis     Umbilicated papule    Erythematous-base heme-crusted tan verrucoid plaque consistent with inflamed seborrheic keratosis     Erythematous Silvery Scaling Plaque c/w Psoriasis     See annotation      Assessment / Plan:        Multiple actinic keratoses  Will use rx efudex for 2 weeks     Seborrheic keratoses  Seborrheic keratosis scattered, told benign no treatment needed.  Brochure provided.      Lentigines  The "ABCD" rules to observe pigmented lesions were reviewed.      Gleason angiomas  This is a benign vascular lesion. Reassurance given. No treatment required.       Nevus of multiple sites  The "ABCD" rules to observe pigmented lesions were reviewed.      History of skin cancer  Comments:  sccis scalp  Area(s) of previous NMSC evaluated with no signs of recurrence.    Upper body skin examination performed today including at least 6 points as noted in physical examination. No lesions suspicious for malignancy noted.    Recommend daily sun protection/avoidance and use of at least SPF 30, broad spectrum sunscreen (OTC drug).       Eczema of both hands  Try eucerin dry skin relief cream qd              Follow up in about 6 months (around 6/13/2024).  "

## 2023-12-20 ENCOUNTER — LAB VISIT (OUTPATIENT)
Dept: LAB | Facility: HOSPITAL | Age: 63
End: 2023-12-20
Attending: INTERNAL MEDICINE
Payer: COMMERCIAL

## 2023-12-20 DIAGNOSIS — Z00.00 ROUTINE PHYSICAL EXAMINATION: ICD-10-CM

## 2023-12-20 DIAGNOSIS — Z12.11 ENCOUNTER FOR FIT (FECAL IMMUNOCHEMICAL TEST) SCREENING: ICD-10-CM

## 2023-12-20 PROCEDURE — 82274 ASSAY TEST FOR BLOOD FECAL: CPT | Performed by: INTERNAL MEDICINE

## 2023-12-28 LAB — HEMOCCULT STL QL IA: NEGATIVE

## 2024-06-10 ENCOUNTER — PATIENT MESSAGE (OUTPATIENT)
Dept: INTERNAL MEDICINE | Facility: CLINIC | Age: 64
End: 2024-06-10
Payer: COMMERCIAL

## 2025-03-20 ENCOUNTER — OFFICE VISIT (OUTPATIENT)
Dept: INTERNAL MEDICINE | Facility: CLINIC | Age: 65
End: 2025-03-20
Payer: COMMERCIAL

## 2025-03-20 ENCOUNTER — HOSPITAL ENCOUNTER (OUTPATIENT)
Dept: RADIOLOGY | Facility: HOSPITAL | Age: 65
Discharge: HOME | End: 2025-03-20
Attending: PHYSICIAN ASSISTANT
Payer: COMMERCIAL

## 2025-03-20 VITALS
OXYGEN SATURATION: 95 % | SYSTOLIC BLOOD PRESSURE: 106 MMHG | DIASTOLIC BLOOD PRESSURE: 66 MMHG | WEIGHT: 205.94 LBS | BODY MASS INDEX: 29.48 KG/M2 | HEART RATE: 65 BPM | HEIGHT: 70 IN

## 2025-03-20 DIAGNOSIS — R19.09 LEFT GROIN MASS: Primary | ICD-10-CM

## 2025-03-20 DIAGNOSIS — R07.81 RIB PAIN ON RIGHT SIDE: ICD-10-CM

## 2025-03-20 DIAGNOSIS — R19.09 LEFT GROIN MASS: ICD-10-CM

## 2025-03-20 PROCEDURE — 1125F AMNT PAIN NOTED PAIN PRSNT: CPT | Mod: CPTII,S$GLB,, | Performed by: PHYSICIAN ASSISTANT

## 2025-03-20 PROCEDURE — 99999 PR PBB SHADOW E&M-EST. PATIENT-LVL IV: CPT | Mod: PBBFAC,,, | Performed by: PHYSICIAN ASSISTANT

## 2025-03-20 PROCEDURE — 76882 US LMTD JT/FCL EVL NVASC XTR: CPT | Mod: 26,LT,, | Performed by: INTERNAL MEDICINE

## 2025-03-20 PROCEDURE — 76882 US LMTD JT/FCL EVL NVASC XTR: CPT | Mod: TC,LT

## 2025-03-20 PROCEDURE — 1160F RVW MEDS BY RX/DR IN RCRD: CPT | Mod: CPTII,S$GLB,, | Performed by: PHYSICIAN ASSISTANT

## 2025-03-20 PROCEDURE — 1159F MED LIST DOCD IN RCRD: CPT | Mod: CPTII,S$GLB,, | Performed by: PHYSICIAN ASSISTANT

## 2025-03-20 PROCEDURE — 1101F PT FALLS ASSESS-DOCD LE1/YR: CPT | Mod: CPTII,S$GLB,, | Performed by: PHYSICIAN ASSISTANT

## 2025-03-20 PROCEDURE — 3288F FALL RISK ASSESSMENT DOCD: CPT | Mod: CPTII,S$GLB,, | Performed by: PHYSICIAN ASSISTANT

## 2025-03-20 PROCEDURE — 3074F SYST BP LT 130 MM HG: CPT | Mod: CPTII,S$GLB,, | Performed by: PHYSICIAN ASSISTANT

## 2025-03-20 PROCEDURE — 3008F BODY MASS INDEX DOCD: CPT | Mod: CPTII,S$GLB,, | Performed by: PHYSICIAN ASSISTANT

## 2025-03-20 PROCEDURE — 3078F DIAST BP <80 MM HG: CPT | Mod: CPTII,S$GLB,, | Performed by: PHYSICIAN ASSISTANT

## 2025-03-20 PROCEDURE — 99213 OFFICE O/P EST LOW 20 MIN: CPT | Mod: S$GLB,,, | Performed by: PHYSICIAN ASSISTANT

## 2025-03-20 NOTE — PROGRESS NOTES
"Subjective     Patient ID: David Diamond is a 65 y.o. male. With  has a past medical history of Squamous cell carcinoma.      Chief Complaint: Mass (Above the groin area) and Pain (Right ribcage)    HPI    Established pt of Miah Strickland MD (new to me)    Same day/Urgent Care visit    Here with concerns of left groin mass. Noticed about 1 to 2 days ago. Non tender. Itchy but no rash.     Also notes R rib pain about 3 weeks ago, after play wrestling with grandson, right side hit sofa, it has improved. Mild residual twinge. No swelling or bruising.               Past Medical History:   Diagnosis Date    Squamous cell carcinoma        Social History[1]    Review of patient's allergies indicates:   Allergen Reactions    Pcn [penicillins]        Review of Systems   Constitutional:  Negative for chills, diaphoresis and fever.   Respiratory:  Negative for cough and shortness of breath.    Cardiovascular:  Negative for chest pain.   Genitourinary:  Negative for dysuria, penile pain and scrotal swelling.   Integumentary:  Negative for rash.            Objective  /66 (BP Location: Right arm, Patient Position: Sitting)   Pulse 65   Ht 5' 10" (1.778 m)   Wt 93.4 kg (205 lb 14.6 oz)   SpO2 95%   BMI 29.54 kg/m²       Physical Exam  Vitals reviewed.   Constitutional:       General: He is not in acute distress.     Appearance: He is well-developed. He is not ill-appearing.   HENT:      Head: Normocephalic and atraumatic.   Cardiovascular:      Rate and Rhythm: Normal rate and regular rhythm.      Heart sounds: No murmur heard.  Pulmonary:      Effort: Pulmonary effort is normal.      Breath sounds: Normal breath sounds. No wheezing or rales.   Chest:      Chest wall: No tenderness or crepitus.   Genitourinary:      Musculoskeletal:      Right lower leg: No edema.      Left lower leg: No edema.   Skin:     General: Skin is warm and dry.      Findings: No rash.   Neurological:      Mental Status: He is alert. "   Psychiatric:         Mood and Affect: Mood normal.            Assessment and Plan     1. Left groin mass  -     US Soft Tissue, Groin Left; Future; Expected date: 2025    2. Rib pain on right side  Improving  Non tender on exam  Likely resolving rib contusion  Symptomatic care discussed  Notify if symptoms fall to completely resolve      Advised on scheduling annual exam, overdue     Nallely Mcelroy PA-C         [1]   Social History  Tobacco Use    Smoking status: Former     Current packs/day: 0.00     Average packs/day: 0.3 packs/day for 1 year (0.3 ttl pk-yrs)     Types: Cigarettes     Start date: 10/2/1969     Quit date: 10/2/1970     Years since quittin.5    Smokeless tobacco: Former   Substance Use Topics    Drug use: No

## 2025-03-21 ENCOUNTER — RESULTS FOLLOW-UP (OUTPATIENT)
Dept: INTERNAL MEDICINE | Facility: CLINIC | Age: 65
End: 2025-03-21
Payer: COMMERCIAL

## 2025-03-21 DIAGNOSIS — K40.90 LEFT INGUINAL HERNIA: Primary | ICD-10-CM

## 2025-03-28 ENCOUNTER — LAB VISIT (OUTPATIENT)
Dept: LAB | Facility: HOSPITAL | Age: 65
End: 2025-03-28
Attending: INTERNAL MEDICINE
Payer: COMMERCIAL

## 2025-03-28 ENCOUNTER — DOCUMENTATION ONLY (OUTPATIENT)
Dept: INTERNAL MEDICINE | Facility: CLINIC | Age: 65
End: 2025-03-28
Payer: COMMERCIAL

## 2025-03-28 ENCOUNTER — OFFICE VISIT (OUTPATIENT)
Dept: INTERNAL MEDICINE | Facility: CLINIC | Age: 65
End: 2025-03-28
Payer: COMMERCIAL

## 2025-03-28 VITALS
DIASTOLIC BLOOD PRESSURE: 60 MMHG | HEIGHT: 70 IN | HEART RATE: 84 BPM | WEIGHT: 205.94 LBS | BODY MASS INDEX: 29.48 KG/M2 | OXYGEN SATURATION: 99 % | SYSTOLIC BLOOD PRESSURE: 108 MMHG

## 2025-03-28 DIAGNOSIS — Z12.5 SCREENING FOR PROSTATE CANCER: ICD-10-CM

## 2025-03-28 DIAGNOSIS — Z13.6 SCREENING FOR AAA (AORTIC ABDOMINAL ANEURYSM): ICD-10-CM

## 2025-03-28 DIAGNOSIS — Z00.00 ROUTINE PHYSICAL EXAMINATION: ICD-10-CM

## 2025-03-28 DIAGNOSIS — K40.90 LEFT INGUINAL HERNIA: ICD-10-CM

## 2025-03-28 DIAGNOSIS — Z12.11 ENCOUNTER FOR FIT (FECAL IMMUNOCHEMICAL TEST) SCREENING: ICD-10-CM

## 2025-03-28 DIAGNOSIS — Z00.00 ROUTINE PHYSICAL EXAMINATION: Primary | ICD-10-CM

## 2025-03-28 LAB
ABSOLUTE EOSINOPHIL (OHS): 0.15 K/UL
ABSOLUTE MONOCYTE (OHS): 0.49 K/UL (ref 0.3–1)
ABSOLUTE NEUTROPHIL COUNT (OHS): 2.48 K/UL (ref 1.8–7.7)
ANION GAP (OHS): 8 MMOL/L (ref 8–16)
BASOPHILS # BLD AUTO: 0.04 K/UL
BASOPHILS NFR BLD AUTO: 0.8 %
BUN SERPL-MCNC: 20 MG/DL (ref 8–23)
CALCIUM SERPL-MCNC: 9.4 MG/DL (ref 8.7–10.5)
CHLORIDE SERPL-SCNC: 104 MMOL/L (ref 95–110)
CHOLEST SERPL-MCNC: 188 MG/DL (ref 120–199)
CHOLEST/HDLC SERPL: 2.4 {RATIO} (ref 2–5)
CO2 SERPL-SCNC: 28 MMOL/L (ref 23–29)
CREAT SERPL-MCNC: 0.9 MG/DL (ref 0.5–1.4)
EAG (OHS): 103 MG/DL (ref 68–131)
ERYTHROCYTE [DISTWIDTH] IN BLOOD BY AUTOMATED COUNT: 13.2 % (ref 11.5–14.5)
GFR SERPLBLD CREATININE-BSD FMLA CKD-EPI: >60 ML/MIN/1.73/M2
GLUCOSE SERPL-MCNC: 98 MG/DL (ref 70–110)
HBA1C MFR BLD: 5.2 % (ref 4–5.6)
HCT VFR BLD AUTO: 44.7 % (ref 40–54)
HDLC SERPL-MCNC: 80 MG/DL (ref 40–75)
HDLC SERPL: 42.6 % (ref 20–50)
HGB BLD-MCNC: 14.4 GM/DL (ref 14–18)
IMM GRANULOCYTES # BLD AUTO: 0 K/UL (ref 0–0.04)
IMM GRANULOCYTES NFR BLD AUTO: 0 % (ref 0–0.5)
LDLC SERPL CALC-MCNC: 100.6 MG/DL (ref 63–159)
LYMPHOCYTES # BLD AUTO: 1.82 K/UL (ref 1–4.8)
MCH RBC QN AUTO: 30.7 PG (ref 27–50)
MCHC RBC AUTO-ENTMCNC: 32.2 G/DL (ref 32–36)
MCV RBC AUTO: 95 FL (ref 82–98)
NONHDLC SERPL-MCNC: 108 MG/DL
NUCLEATED RBC (/100WBC) (OHS): 0 /100 WBC
PLATELET # BLD AUTO: 199 K/UL (ref 150–450)
PMV BLD AUTO: 11.8 FL (ref 9.2–12.9)
POTASSIUM SERPL-SCNC: 5 MMOL/L (ref 3.5–5.1)
PSA SERPL-MCNC: 3.56 NG/ML
RBC # BLD AUTO: 4.69 M/UL (ref 4.6–6.2)
RELATIVE EOSINOPHIL (OHS): 3 %
RELATIVE LYMPHOCYTE (OHS): 36.5 % (ref 18–48)
RELATIVE MONOCYTE (OHS): 9.8 % (ref 4–15)
RELATIVE NEUTROPHIL (OHS): 49.9 % (ref 38–73)
SODIUM SERPL-SCNC: 140 MMOL/L (ref 136–145)
TRIGL SERPL-MCNC: 37 MG/DL (ref 30–150)
TSH SERPL-ACNC: 2.39 UIU/ML (ref 0.4–4)
WBC # BLD AUTO: 4.98 K/UL (ref 3.9–12.7)

## 2025-03-28 PROCEDURE — 84153 ASSAY OF PSA TOTAL: CPT

## 2025-03-28 PROCEDURE — 80061 LIPID PANEL: CPT

## 2025-03-28 PROCEDURE — 99999 PR PBB SHADOW E&M-EST. PATIENT-LVL IV: CPT | Mod: PBBFAC,,, | Performed by: INTERNAL MEDICINE

## 2025-03-28 PROCEDURE — 36415 COLL VENOUS BLD VENIPUNCTURE: CPT

## 2025-03-28 PROCEDURE — 85025 COMPLETE CBC W/AUTO DIFF WBC: CPT

## 2025-03-28 PROCEDURE — 84443 ASSAY THYROID STIM HORMONE: CPT

## 2025-03-28 PROCEDURE — 80048 BASIC METABOLIC PNL TOTAL CA: CPT

## 2025-03-28 PROCEDURE — 83036 HEMOGLOBIN GLYCOSYLATED A1C: CPT

## 2025-03-28 NOTE — PROGRESS NOTES
HPI  History of Present Illness    CHIEF COMPLAINT:  David presents for annual exam and follow-up regarding a recently discovered left groin hernia.    HPI:  David recently noticed a lump in his left groin area with an initial sensation resembling a hair being pulled or a stinging sensation. He has occasional sharp sensations that radiate to his testicles, possibly due to nerve involvement in the inguinal canal affected by hernia fluctuations. IVANIA Mcelroy evaluated him last week and diagnosed a left groin hernia. Imaging revealed fat and suspected bowel content in the hernia. He has not had significant pain or discomfort beyond the occasional stinging sensation. He has begun researching potential surgeons online but has not yet seen one. David has no history of groin pain, lower abdominal surgery, or pelvic surgery. He denies any family history of anesthesia complications, bleeding difficulties, or clotting problems. He denies nausea or other complications with previous surgeries.  Otherwise patient has been doing well.  He walks a great deal and exercises regularly with no chest pain or shortness for breath.  No fevers or chills.  We would like to update some blood work and he may need a EKG.    MEDICAL HISTORY:  David has a history of inguinal hernia, which was recently diagnosed. The hernia contains fat and is suspected to include bowel content.    SURGICAL HISTORY:  David has undergone hip surgery and gallbladder surgery. The dates for these procedures are not specified.      ROS:  Musculoskeletal: + left groin pain            Review of Systems   Constitutional:  Negative for chills, fatigue and fever.   HENT:  Negative for nosebleeds and trouble swallowing.    Eyes:  Negative for pain and visual disturbance.   Respiratory:  Negative for cough, shortness of breath and wheezing.    Cardiovascular:  Negative for chest pain and palpitations.   Gastrointestinal:  Negative for abdominal pain, constipation, diarrhea, nausea  and vomiting.   Genitourinary:  Negative for difficulty urinating and hematuria.   Musculoskeletal:  Negative for arthralgias, back pain and neck pain.   Integumentary:  Negative for rash.   Neurological:  Negative for dizziness and headaches.   Hematological:  Does not bruise/bleed easily.   Psychiatric/Behavioral:  Negative for dysphoric mood and sleep disturbance.        Past Medical History:   Diagnosis Date    Squamous cell carcinoma      Past Surgical History:   Procedure Laterality Date    CHOLECYSTECTOMY      HIP ARTHROPLASTY Left     Tonsils        Problem List[1]       Objective:      Physical Exam            Physical Exam  Constitutional:       General: He is not in acute distress.     Appearance: He is well-developed.   HENT:      Head: Normocephalic and atraumatic.      Right Ear: Tympanic membrane, ear canal and external ear normal.      Left Ear: Tympanic membrane, ear canal and external ear normal.      Mouth/Throat:      Pharynx: No oropharyngeal exudate or posterior oropharyngeal erythema.   Eyes:      General: No scleral icterus.     Conjunctiva/sclera: Conjunctivae normal.      Pupils: Pupils are equal, round, and reactive to light.   Neck:      Thyroid: No thyromegaly.      Comments: No supraclavicular nodes palpated  Cardiovascular:      Rate and Rhythm: Normal rate and regular rhythm.      Pulses: Normal pulses.      Heart sounds: Normal heart sounds. No murmur heard.  Pulmonary:      Effort: Pulmonary effort is normal.      Breath sounds: Normal breath sounds. No wheezing.   Abdominal:      General: Bowel sounds are normal.      Palpations: Abdomen is soft. There is no mass.      Tenderness: There is no abdominal tenderness.      Hernia: A hernia is present.   Musculoskeletal:         General: No tenderness.      Cervical back: Normal range of motion and neck supple.      Right lower leg: No edema.      Left lower leg: No edema.   Lymphadenopathy:      Cervical: No cervical adenopathy.  "  Skin:     Coloration: Skin is not jaundiced or pale.   Neurological:      General: No focal deficit present.      Mental Status: He is alert and oriented to person, place, and time.   Psychiatric:         Mood and Affect: Mood normal.         Behavior: Behavior normal.           Assessment:       Problem List Items Addressed This Visit          GI    Left inguinal hernia     Other Visit Diagnoses         Routine physical examination    -  Primary    Relevant Orders    VAS US Abdominal Aorta    Fecal Immunochemical Test (iFOBT)    Lipid Panel    PSA, Screening    CBC Auto Differential    Basic Metabolic Panel    Hemoglobin A1C    TSH      Screening for AAA (aortic abdominal aneurysm)        Relevant Orders    VAS US Abdominal Aorta      Encounter for FIT (fecal immunochemical test) screening        Relevant Orders    Fecal Immunochemical Test (iFOBT)      Screening for prostate cancer        Relevant Orders    PSA, Screening            Plan:       David Perera" was seen today for annual exam.    Diagnoses and all orders for this visit:    Routine physical examination  -     VAS US Abdominal Aorta; Future  -     Fecal Immunochemical Test (iFOBT); Future  -     Lipid Panel; Future  -     PSA, Screening; Future  -     CBC Auto Differential; Future  -     Basic Metabolic Panel; Future  -     Hemoglobin A1C; Future  -     TSH; Future  -     Fecal Immunochemical Test (iFOBT)    Left inguinal hernia    Screening for AAA (aortic abdominal aneurysm)  -     VAS US Abdominal Aorta; Future    Encounter for FIT (fecal immunochemical test) screening  -     Fecal Immunochemical Test (iFOBT); Future  -     Fecal Immunochemical Test (iFOBT)    Screening for prostate cancer  -     PSA, Screening; Future         Review all studies.  Consider preop clearance if needed once studies reviewed, if surgery for hernia repair is planned        Portions of this note may have been created with [employer] voice recognition software " "permission given by patient. Occasional "wrong-word" or "sound-a-like" substitutions may have occurred due to the inherent limitations of voice recognition software. Please, read the note carefully and recognize, using context, where substitutions have occurred.         [1]   Patient Active Problem List  Diagnosis    Lipoma    Left inguinal hernia     "

## 2025-03-30 ENCOUNTER — PATIENT MESSAGE (OUTPATIENT)
Dept: INTERNAL MEDICINE | Facility: CLINIC | Age: 65
End: 2025-03-30
Payer: COMMERCIAL

## 2025-04-03 ENCOUNTER — HOSPITAL ENCOUNTER (OUTPATIENT)
Dept: VASCULAR SURGERY | Facility: CLINIC | Age: 65
Discharge: HOME | End: 2025-04-03
Attending: INTERNAL MEDICINE
Payer: COMMERCIAL

## 2025-04-03 DIAGNOSIS — Z13.6 SCREENING FOR AAA (AORTIC ABDOMINAL ANEURYSM): ICD-10-CM

## 2025-04-03 DIAGNOSIS — Z00.00 ROUTINE PHYSICAL EXAMINATION: ICD-10-CM

## 2025-04-07 ENCOUNTER — PATIENT MESSAGE (OUTPATIENT)
Dept: INTERNAL MEDICINE | Facility: CLINIC | Age: 65
End: 2025-04-07
Payer: COMMERCIAL

## 2025-04-09 ENCOUNTER — OFFICE VISIT (OUTPATIENT)
Dept: SURGERY | Facility: CLINIC | Age: 65
End: 2025-04-09
Payer: COMMERCIAL

## 2025-04-09 ENCOUNTER — HOSPITAL ENCOUNTER (OUTPATIENT)
Dept: CARDIOLOGY | Facility: CLINIC | Age: 65
Discharge: HOME | End: 2025-04-09
Payer: COMMERCIAL

## 2025-04-09 VITALS
HEIGHT: 70 IN | DIASTOLIC BLOOD PRESSURE: 69 MMHG | WEIGHT: 210.19 LBS | SYSTOLIC BLOOD PRESSURE: 130 MMHG | BODY MASS INDEX: 30.09 KG/M2 | HEART RATE: 59 BPM

## 2025-04-09 DIAGNOSIS — K40.90 LEFT INGUINAL HERNIA: ICD-10-CM

## 2025-04-09 DIAGNOSIS — K40.90 LEFT INGUINAL HERNIA: Primary | ICD-10-CM

## 2025-04-09 LAB
OHS QRS DURATION: 92 MS
OHS QTC CALCULATION: 401 MS

## 2025-04-09 PROCEDURE — 99999 PR PBB SHADOW E&M-EST. PATIENT-LVL IV: CPT | Mod: PBBFAC,,, | Performed by: SURGERY

## 2025-04-09 PROCEDURE — 99204 OFFICE O/P NEW MOD 45 MIN: CPT | Mod: S$GLB,,, | Performed by: SURGERY

## 2025-04-09 PROCEDURE — 3078F DIAST BP <80 MM HG: CPT | Mod: CPTII,S$GLB,, | Performed by: SURGERY

## 2025-04-09 PROCEDURE — 3288F FALL RISK ASSESSMENT DOCD: CPT | Mod: CPTII,S$GLB,, | Performed by: SURGERY

## 2025-04-09 PROCEDURE — 3075F SYST BP GE 130 - 139MM HG: CPT | Mod: CPTII,S$GLB,, | Performed by: SURGERY

## 2025-04-09 PROCEDURE — 3044F HG A1C LEVEL LT 7.0%: CPT | Mod: CPTII,S$GLB,, | Performed by: SURGERY

## 2025-04-09 PROCEDURE — 93010 ELECTROCARDIOGRAM REPORT: CPT | Mod: S$GLB,,, | Performed by: STUDENT IN AN ORGANIZED HEALTH CARE EDUCATION/TRAINING PROGRAM

## 2025-04-09 PROCEDURE — 1126F AMNT PAIN NOTED NONE PRSNT: CPT | Mod: CPTII,S$GLB,, | Performed by: SURGERY

## 2025-04-09 PROCEDURE — 1101F PT FALLS ASSESS-DOCD LE1/YR: CPT | Mod: CPTII,S$GLB,, | Performed by: SURGERY

## 2025-04-09 PROCEDURE — 1160F RVW MEDS BY RX/DR IN RCRD: CPT | Mod: CPTII,S$GLB,, | Performed by: SURGERY

## 2025-04-09 PROCEDURE — 93005 ELECTROCARDIOGRAM TRACING: CPT | Mod: S$GLB,,, | Performed by: SURGERY

## 2025-04-09 PROCEDURE — 1159F MED LIST DOCD IN RCRD: CPT | Mod: CPTII,S$GLB,, | Performed by: SURGERY

## 2025-04-09 PROCEDURE — 3008F BODY MASS INDEX DOCD: CPT | Mod: CPTII,S$GLB,, | Performed by: SURGERY

## 2025-04-09 NOTE — PROGRESS NOTES
GENERAL SURGERY  Clinic Note        SUBJECTIVE:     HISTORY OF PRESENT ILLNESS:  David Diamond is a 65 y.o. healthy male who has been referred to surgery clinic for left inguinal hernia.  He reports noticing a new painful lump about two weeks ago. This area bothers him during activity, sitting, and improves with laying flat. He has never had symptoms or been diagnosed with a hernia before. He denies GI symptoms.     He is an active man who works as an  (no active lifting at work) and goes to the gym regularly.         MEDICATIONS:  Home Medications:Medications Ordered Prior to Encounter[1]    ALLERGIES:    Review of patient's allergies indicates:   Allergen Reactions    Pcn [penicillins]        PAST MEDICAL HISTORY:    Past Medical History:   Diagnosis Date    Squamous cell carcinoma        SURGICAL HISTORY:  Past Surgical History:   Procedure Laterality Date    CHOLECYSTECTOMY      HIP ARTHROPLASTY Left     Tonsils         FAMILY HISTORY:  Family History   Problem Relation Name Age of Onset    Skin cancer Father         SOCIAL HISTORY:  Social History[2]     REVIEW OF SYSTEMS:  Review of Systems   Constitutional:  Positive for activity change. Negative for appetite change, fatigue and fever.   Respiratory:  Negative for shortness of breath.    Cardiovascular:  Negative for chest pain.   Gastrointestinal:  Negative for abdominal pain, nausea and vomiting.   Genitourinary:  Positive for testicular pain. Negative for difficulty urinating, dysuria and scrotal swelling.   Skin: Negative.    Psychiatric/Behavioral: Negative.           OBJECTIVE:     Most Recent Vitals:  Pulse: (!) 59 (04/09/25 1333)  BP: 130/69 (04/09/25 1333)      PHYSICAL EXAM:  Physical Exam  Vitals and nursing note reviewed.   Constitutional:       Appearance: Normal appearance.   HENT:      Head: Normocephalic and atraumatic.   Cardiovascular:      Rate and Rhythm: Normal rate and regular rhythm.   Pulmonary:      Effort: Pulmonary effort  "is normal. No respiratory distress.   Abdominal:      General: Abdomen is flat.      Palpations: Abdomen is soft.      Comments: Bilateral inguinal hernia L > R   Reducible, prominent with valsalva    Skin:     General: Skin is warm and dry.   Neurological:      General: No focal deficit present.      Mental Status: He is alert and oriented to person, place, and time.   Psychiatric:         Mood and Affect: Mood normal.         Behavior: Behavior normal.           LABORATORY VALUES:  Lab Results   Component Value Date    WBC 4.98 03/28/2025    HGB 14.4 03/28/2025    HCT 44.7 03/28/2025     03/28/2025    HGBA1C 5.2 03/28/2025     Lab Results   Component Value Date     03/28/2025    K 5.0 03/28/2025     03/28/2025    CO2 28 03/28/2025    BUN 20 03/28/2025    CREATININE 0.9 03/28/2025     11/24/2023    CALCIUM 9.4 03/28/2025    AST 24 11/24/2023    ALT 20 11/24/2023    ALKPHOS 45 (L) 11/24/2023    BILITOT 0.5 11/24/2023    BILIDIR 0.2 02/01/2010    PROT 7.0 11/24/2023    ALBUMIN 3.8 11/24/2023     No results found for: "INR", "PTT"  Lab Results   Component Value Date    TSH 2.386 03/28/2025         DIAGNOSTIC STUDIES:  US SOFT TISSUE, GROIN LEFT     CLINICAL HISTORY:  Other intra-abdominal and pelvic swelling, mass and lump     TECHNIQUE:  Focused evaluation of the left inguinal canal in both standing and supine positions as well as following Valsalva maneuver.  Additional images were obtained through right inguinal canal for comparison.     COMPARISON:  MRI PELVIS 06/16/2006     FINDINGS:  There is a left inguinal hernia, new from 2006.  The hernia neck measures 3.8 cm in diameter.  There is fat and suspected bowel in the hernia sac.  No free fluid.     No right inguinal hernia.     Impression:     Left inguinal hernia containing fat and suspected bowel.    ASSESSMENT:     David Diamond is a 65 y.o. male referred for left inguinal hernia which was first noticed two weeks ago. It is reducible " and there is a right inguinal hernia palpable as well. He is a good surgical candidate and desires repair.       PLAN:  Lap assisted left and likely right inguinal hernia repair     Maggie Vanegas MD  PGY-5, General Surgery  [facility]             [1]   Current Outpatient Medications on File Prior to Visit   Medication Sig Dispense Refill    clindamycin (CLEOCIN) 300 MG capsule       fluorouraciL (EFUDEX) 5 % cream APPLY TOPICALLY TO THE AFFECTED AREA AT BEDTIME FOR 2 WEEKS 40 g 3     No current facility-administered medications on file prior to visit.   [2]   Social History  Tobacco Use    Smoking status: Former     Current packs/day: 0.00     Average packs/day: 0.3 packs/day for 1 year (0.3 ttl pk-yrs)     Types: Cigarettes     Start date: 10/2/1969     Quit date: 10/2/1970     Years since quittin.5    Smokeless tobacco: Former   Substance Use Topics    Drug use: No

## 2025-04-29 PROCEDURE — 82274 ASSAY TEST FOR BLOOD FECAL: CPT | Performed by: INTERNAL MEDICINE

## 2025-04-29 RX ORDER — SODIUM CHLORIDE 9 MG/ML
INJECTION, SOLUTION INTRAVENOUS CONTINUOUS
OUTPATIENT
Start: 2025-04-29

## 2025-05-01 ENCOUNTER — RESULTS FOLLOW-UP (OUTPATIENT)
Dept: INTERNAL MEDICINE | Facility: CLINIC | Age: 65
End: 2025-05-01

## 2025-05-22 NOTE — PRE-PROCEDURE INSTRUCTIONS
PreOp Instructions given:    -- Medication information (what to hold and what to take)   -- NPO guidelines as follows: (or as per your Surgeon)  Stop ALL solid food, gum, candy 8 hours before arrival time.  Stop all CLOUDY liquids: coffee with creamer, cloudy juices, 8 hours prior to arrival time.  The patient should be ENCOURAGED to drink carbohydrate-rich clear liquids (sports drinks, clear juices) until 2 hours prior to arrival time.  Stop clear liquids 2 hours prior to arrival time.  CLEAR liquids include water, black coffee NO creamer, clear oral rehydration drinks, clear sports drinks and clear fruit juices (no orange juice, no pulpy juices, no apple cider).   IF IN DOUBT, drink water instead.   NOTHING TO DRINK 2 hours before to surgery/procedure  time. If you are told to take medication on the morning of surgery, it may be taken with a sip of water.   -- *Arrival place and directions given*.  Time to be given the day before procedure by the Surgeon's Office   -- Bathe with antibacterial soap (dial or Hibiclens as instructed)  -- Don't wear any jewelry or valuables and not metals on skin or hair AM of surgery   -- No makeup or moisturizer to face   -- No perfume/cologne, powder, lotions, aftershave or deodorant    Pt verbalized understanding.         *If going to [facility], see below:     Directions and Instructions for [facility]   At [facility], we have an outstanding team of physicians, anesthesiologists, CRNAs, Registered Nurses, Surgical Technologists, and other ancillary team members all focused on your surgical and procedural care.   Before Your Procedure:   The physician's office will call you with a specific arrival time and directions a day or two before your scheduled procedure. You may also receive these instructions through your [facility] portal.   Day of Procedure:   Please be sure to arrive at the arrival time given or you may risk your  surgery being delayed or canceled. The arrival time is earlier than your scheduled surgery or procedure time. In the winter months please dress warm and bring blankets for you or your child as the waiting room may be cold. If you have difficulty locating the facility, please give us a call at [phone].   Directions:   The [facility] is located on the 1st floor of the hospital building near the [place] entrance.   Parking:   You will park in the [facility] Park[facility]age (note location on map). [facility] opens at 5:00 a.m. and has a drop off area by the entrance.  parking is available starting at 7:00 a.m. Please see below for further  parking instructions.   Directions from the parking garage elevators   Blue [facility] Elevators: From the parking garage, take the blue Randh[employer] Tracy elevators (located in the center of the parking garage) to the 1st floor of the garage. You will then take a right once off the elevators then another right to the outside of the parking garage. You will be across from the [facility]. You will walk down the sidewalk, pass the  curve at the [place] entrance and continue to follow the sidewalk. You will pass the radiation oncology entrance on your right. Continue to follow the sidewalk to the [facility] glass door entrance.   Hospital Entrance (Inside Route): If a mostly inside route is preferred: Take the inside elevator bank (located at the far north end of the garage) from the parking garage to the 1st floor. On the 1st floor walk past [employer]. Keep walking down the center of the hallway towards the hospital elevators. Once you reach the red brick ravi, take a left and go past the hospital elevators. Take another left and follow the blue and white Randhawa Jara signs around the hallway to the end. Go outside of the door. You will see the [facility] entrance to your right.   Drop Off:    There is a drop off area at the doors of the [facility] surgery center for your convenience. If utilized for pediatric patients, an adult must accompany the patient into the surgery center while another adult fajardo the vehicle.   Leonardo (at 7:00 a.m.):   Upon check-in, please let the  know that you are utilizing [employer] parking which is free. The . will then call [employer] for your car to be picked up. Your keys and phone number will be collected and given to [employer] services. You will then be given a ticket. Upon discharge, [employer] will be notified to bring your vehicle back when you are ready.   2/6/2024      If going to 2nd floor surgery center ([facility]), see below:    Directions to the 2nd floor ([facility]) Surgery Center  The hallway to get to the surgery center is on the 2nd fl between the gold elevators in the atrium.  Follow the hallway into the waiting room and check in at desk.

## 2025-05-23 ENCOUNTER — TELEPHONE (OUTPATIENT)
Dept: SURGERY | Facility: CLINIC | Age: 65
End: 2025-05-23
Payer: COMMERCIAL

## 2025-05-26 ENCOUNTER — ANESTHESIA (OUTPATIENT)
Dept: SURGERY | Facility: HOSPITAL | Age: 65
End: 2025-05-26
Payer: COMMERCIAL

## 2025-05-26 ENCOUNTER — ANESTHESIA EVENT (OUTPATIENT)
Dept: SURGERY | Facility: HOSPITAL | Age: 65
End: 2025-05-26
Payer: COMMERCIAL

## 2025-05-26 ENCOUNTER — HOSPITAL ENCOUNTER (OUTPATIENT)
Facility: HOSPITAL | Age: 65
Discharge: HOME | End: 2025-05-26
Attending: SURGERY | Admitting: SURGERY
Payer: COMMERCIAL

## 2025-05-26 VITALS
TEMPERATURE: 98 F | RESPIRATION RATE: 20 BRPM | SYSTOLIC BLOOD PRESSURE: 143 MMHG | OXYGEN SATURATION: 97 % | HEART RATE: 67 BPM | DIASTOLIC BLOOD PRESSURE: 68 MMHG

## 2025-05-26 DIAGNOSIS — K40.90 INGUINAL HERNIA: ICD-10-CM

## 2025-05-26 DIAGNOSIS — K40.90 LEFT INGUINAL HERNIA: Primary | ICD-10-CM

## 2025-05-26 PROCEDURE — 63600175 PHARM REV CODE 636 W HCPCS

## 2025-05-26 PROCEDURE — 71000016 HC POSTOP RECOV ADDL HR: Performed by: SURGERY

## 2025-05-26 PROCEDURE — D9220A PRA ANESTHESIA: Mod: ,,, | Performed by: ANESTHESIOLOGY

## 2025-05-26 PROCEDURE — 25000003 PHARM REV CODE 250: Performed by: STUDENT IN AN ORGANIZED HEALTH CARE EDUCATION/TRAINING PROGRAM

## 2025-05-26 PROCEDURE — 63600175 PHARM REV CODE 636 W HCPCS: Performed by: STUDENT IN AN ORGANIZED HEALTH CARE EDUCATION/TRAINING PROGRAM

## 2025-05-26 PROCEDURE — 36000710: Performed by: SURGERY

## 2025-05-26 PROCEDURE — 71000015 HC POSTOP RECOV 1ST HR: Performed by: SURGERY

## 2025-05-26 PROCEDURE — 63600175 PHARM REV CODE 636 W HCPCS: Performed by: SURGERY

## 2025-05-26 PROCEDURE — 71000044 HC DOSC ROUTINE RECOVERY FIRST HOUR: Performed by: SURGERY

## 2025-05-26 PROCEDURE — 27201423 OPTIME MED/SURG SUP & DEVICES STERILE SUPPLY: Performed by: SURGERY

## 2025-05-26 PROCEDURE — 49650 LAP ING HERNIA REPAIR INIT: CPT | Mod: LT,,, | Performed by: SURGERY

## 2025-05-26 PROCEDURE — 37000008 HC ANESTHESIA 1ST 15 MINUTES: Performed by: SURGERY

## 2025-05-26 PROCEDURE — 37000009 HC ANESTHESIA EA ADD 15 MINS: Performed by: SURGERY

## 2025-05-26 PROCEDURE — C1781 MESH (IMPLANTABLE): HCPCS | Performed by: SURGERY

## 2025-05-26 PROCEDURE — 36000711: Performed by: SURGERY

## 2025-05-26 DEVICE — MESH 3DMAX ANAT LG LEFT 4X6IN: Type: IMPLANTABLE DEVICE | Site: ABDOMEN | Status: FUNCTIONAL

## 2025-05-26 RX ORDER — CEFAZOLIN 2 G/1
2 INJECTION, POWDER, FOR SOLUTION INTRAMUSCULAR; INTRAVENOUS
Status: DISCONTINUED | OUTPATIENT
Start: 2025-05-26 | End: 2025-05-26

## 2025-05-26 RX ORDER — PROPOFOL 10 MG/ML
VIAL (ML) INTRAVENOUS
Status: DISCONTINUED | OUTPATIENT
Start: 2025-05-26 | End: 2025-05-26

## 2025-05-26 RX ORDER — HYDROMORPHONE HYDROCHLORIDE 1 MG/ML
INJECTION, SOLUTION INTRAMUSCULAR; INTRAVENOUS; SUBCUTANEOUS
Status: DISCONTINUED
Start: 2025-05-26 | End: 2025-05-26 | Stop reason: HOSPADM

## 2025-05-26 RX ORDER — OXYCODONE HYDROCHLORIDE 5 MG/1
5 TABLET ORAL ONCE AS NEEDED
Refills: 0 | Status: COMPLETED | OUTPATIENT
Start: 2025-05-26 | End: 2025-05-26

## 2025-05-26 RX ORDER — FENTANYL CITRATE 50 UG/ML
INJECTION, SOLUTION INTRAMUSCULAR; INTRAVENOUS
Status: DISCONTINUED | OUTPATIENT
Start: 2025-05-26 | End: 2025-05-26

## 2025-05-26 RX ORDER — DEXAMETHASONE SODIUM PHOSPHATE 4 MG/ML
INJECTION, SOLUTION INTRA-ARTICULAR; INTRALESIONAL; INTRAMUSCULAR; INTRAVENOUS; SOFT TISSUE
Status: DISCONTINUED | OUTPATIENT
Start: 2025-05-26 | End: 2025-05-26

## 2025-05-26 RX ORDER — ACETAMINOPHEN 500 MG
1000 TABLET ORAL
Status: COMPLETED | OUTPATIENT
Start: 2025-05-26 | End: 2025-05-26

## 2025-05-26 RX ORDER — LIDOCAINE HYDROCHLORIDE 10 MG/ML
1 INJECTION, SOLUTION EPIDURAL; INFILTRATION; INTRACAUDAL; PERINEURAL ONCE AS NEEDED
Status: COMPLETED | OUTPATIENT
Start: 2025-05-26 | End: 2025-05-26

## 2025-05-26 RX ORDER — TALC
6 POWDER (GRAM) TOPICAL NIGHTLY PRN
Status: DISCONTINUED | OUTPATIENT
Start: 2025-05-26 | End: 2025-05-26 | Stop reason: HOSPADM

## 2025-05-26 RX ORDER — LIDOCAINE HYDROCHLORIDE 10 MG/ML
INJECTION, SOLUTION EPIDURAL; INFILTRATION; INTRACAUDAL; PERINEURAL
Status: COMPLETED
Start: 2025-05-26 | End: 2025-05-26

## 2025-05-26 RX ORDER — OXYCODONE HYDROCHLORIDE 5 MG/1
5 TABLET ORAL EVERY 6 HOURS PRN
Qty: 16 TABLET | Refills: 0 | Status: SHIPPED | OUTPATIENT
Start: 2025-05-26

## 2025-05-26 RX ORDER — GLUCAGON 1 MG
1 KIT INJECTION
Status: DISCONTINUED | OUTPATIENT
Start: 2025-05-26 | End: 2025-05-26 | Stop reason: HOSPADM

## 2025-05-26 RX ORDER — HYDROMORPHONE HYDROCHLORIDE 1 MG/ML
0.2 INJECTION, SOLUTION INTRAMUSCULAR; INTRAVENOUS; SUBCUTANEOUS EVERY 5 MIN PRN
Status: DISCONTINUED | OUTPATIENT
Start: 2025-05-26 | End: 2025-05-26 | Stop reason: HOSPADM

## 2025-05-26 RX ORDER — ROCURONIUM BROMIDE 10 MG/ML
INJECTION, SOLUTION INTRAVENOUS
Status: DISCONTINUED | OUTPATIENT
Start: 2025-05-26 | End: 2025-05-26

## 2025-05-26 RX ORDER — SODIUM CHLORIDE 0.9 % (FLUSH) 0.9 %
10 SYRINGE (ML) INJECTION
Status: DISCONTINUED | OUTPATIENT
Start: 2025-05-26 | End: 2025-05-26 | Stop reason: HOSPADM

## 2025-05-26 RX ORDER — LIDOCAINE HYDROCHLORIDE 20 MG/ML
INJECTION, SOLUTION EPIDURAL; INFILTRATION; INTRACAUDAL; PERINEURAL
Status: DISCONTINUED | OUTPATIENT
Start: 2025-05-26 | End: 2025-05-26

## 2025-05-26 RX ORDER — CEFAZOLIN 2 G/1
2 INJECTION, POWDER, FOR SOLUTION INTRAMUSCULAR; INTRAVENOUS
Status: DISCONTINUED | OUTPATIENT
Start: 2025-05-26 | End: 2025-05-26 | Stop reason: HOSPADM

## 2025-05-26 RX ORDER — BUPIVACAINE HYDROCHLORIDE 2.5 MG/ML
INJECTION, SOLUTION EPIDURAL; INFILTRATION; INTRACAUDAL; PERINEURAL
Status: DISCONTINUED | OUTPATIENT
Start: 2025-05-26 | End: 2025-05-26 | Stop reason: HOSPADM

## 2025-05-26 RX ORDER — SODIUM CHLORIDE 9 MG/ML
INJECTION, SOLUTION INTRAVENOUS CONTINUOUS
Status: DISCONTINUED | OUTPATIENT
Start: 2025-05-26 | End: 2025-05-26 | Stop reason: HOSPADM

## 2025-05-26 RX ORDER — ONDANSETRON 8 MG/1
8 TABLET, ORALLY DISINTEGRATING ORAL EVERY 8 HOURS PRN
Status: DISCONTINUED | OUTPATIENT
Start: 2025-05-26 | End: 2025-05-26 | Stop reason: HOSPADM

## 2025-05-26 RX ORDER — HALOPERIDOL LACTATE 5 MG/ML
0.5 INJECTION, SOLUTION INTRAMUSCULAR EVERY 10 MIN PRN
Status: DISCONTINUED | OUTPATIENT
Start: 2025-05-26 | End: 2025-05-26 | Stop reason: HOSPADM

## 2025-05-26 RX ORDER — ONDANSETRON HYDROCHLORIDE 2 MG/ML
INJECTION, SOLUTION INTRAVENOUS
Status: DISCONTINUED | OUTPATIENT
Start: 2025-05-26 | End: 2025-05-26

## 2025-05-26 RX ORDER — ACETAMINOPHEN 325 MG/1
650 TABLET ORAL EVERY 8 HOURS PRN
Status: DISCONTINUED | OUTPATIENT
Start: 2025-05-26 | End: 2025-05-26 | Stop reason: HOSPADM

## 2025-05-26 RX ADMIN — HYDROMORPHONE HYDROCHLORIDE 0.2 MG: 1 INJECTION, SOLUTION INTRAMUSCULAR; INTRAVENOUS; SUBCUTANEOUS at 10:05

## 2025-05-26 RX ADMIN — SODIUM CHLORIDE: 9 INJECTION, SOLUTION INTRAVENOUS at 07:05

## 2025-05-26 RX ADMIN — SUGAMMADEX 200 MG: 100 INJECTION, SOLUTION INTRAVENOUS at 09:05

## 2025-05-26 RX ADMIN — GLYCOPYRROLATE 0.2 MG: 0.2 INJECTION, SOLUTION INTRAMUSCULAR; INTRAVENOUS at 07:05

## 2025-05-26 RX ADMIN — LIDOCAINE HYDROCHLORIDE 10 MG: 10 INJECTION, SOLUTION EPIDURAL; INFILTRATION; INTRACAUDAL; PERINEURAL at 06:05

## 2025-05-26 RX ADMIN — ROCURONIUM BROMIDE 50 MG: 10 INJECTION, SOLUTION INTRAVENOUS at 07:05

## 2025-05-26 RX ADMIN — LIDOCAINE HYDROCHLORIDE 60 MG: 20 INJECTION, SOLUTION EPIDURAL; INFILTRATION; INTRACAUDAL at 07:05

## 2025-05-26 RX ADMIN — CEFAZOLIN 2 G: 2 INJECTION, POWDER, FOR SOLUTION INTRAMUSCULAR; INTRAVENOUS at 07:05

## 2025-05-26 RX ADMIN — PROPOFOL 160 MG: 10 INJECTION, EMULSION INTRAVENOUS at 07:05

## 2025-05-26 RX ADMIN — FENTANYL CITRATE 100 MCG: 50 INJECTION INTRAMUSCULAR; INTRAVENOUS at 07:05

## 2025-05-26 RX ADMIN — PROPOFOL 60 MG: 10 INJECTION, EMULSION INTRAVENOUS at 08:05

## 2025-05-26 RX ADMIN — ROCURONIUM BROMIDE 20 MG: 10 INJECTION, SOLUTION INTRAVENOUS at 08:05

## 2025-05-26 RX ADMIN — HYDROMORPHONE HYDROCHLORIDE 0.2 MG: 1 INJECTION, SOLUTION INTRAMUSCULAR; INTRAVENOUS; SUBCUTANEOUS at 09:05

## 2025-05-26 RX ADMIN — ONDANSETRON 4 MG: 2 INJECTION INTRAMUSCULAR; INTRAVENOUS at 09:05

## 2025-05-26 RX ADMIN — FENTANYL CITRATE 50 MCG: 50 INJECTION INTRAMUSCULAR; INTRAVENOUS at 08:05

## 2025-05-26 RX ADMIN — OXYCODONE 5 MG: 5 TABLET ORAL at 10:05

## 2025-05-26 RX ADMIN — SODIUM CHLORIDE: 9 INJECTION, SOLUTION INTRAVENOUS at 08:05

## 2025-05-26 RX ADMIN — ACETAMINOPHEN 1000 MG: 500 TABLET ORAL at 06:05

## 2025-05-26 RX ADMIN — ROCURONIUM BROMIDE 30 MG: 10 INJECTION, SOLUTION INTRAVENOUS at 07:05

## 2025-05-26 RX ADMIN — DEXAMETHASONE SODIUM PHOSPHATE 4 MG: 4 INJECTION INTRA-ARTICULAR; INTRALESIONAL; INTRAMUSCULAR; INTRAVENOUS; SOFT TISSUE at 07:05

## 2025-05-26 NOTE — TRANSFER OF CARE
Anesthesia Transfer of Care Note    Patient: David Diamond    Procedure(s) Performed: Procedure(s) (LRB):  XI ROBOTIC REPAIR, HERNIA, INGUINAL, LEFT w/ mesh, possible right (Left)    Patient location: PACU    Anesthesia Type: general    Transport from OR: Transported from OR on 6-10 L/min O2 by face mask with adequate spontaneous ventilation    Post pain: adequate analgesia    Post assessment: no apparent anesthetic complications and tolerated procedure well    Post vital signs: stable    Level of consciousness: awake and alert    Nausea/Vomiting: no nausea/vomiting    Complications: none    Transfer of care protocol was followed      Last vitals: Visit Vitals  /67 (BP Location: Left arm, Patient Position: Lying)   Pulse (!) 51   Temp 36.5 °C (97.7 °F)   Resp 14   SpO2 97%

## 2025-05-26 NOTE — ANESTHESIA PREPROCEDURE EVALUATION
[facility]  Anesthesia Pre-Operative Evaluation         Patient Name: David Diamond  YOB: 1960  MRN: [id number]    SUBJECTIVE:     Pre-operative evaluation for Procedure(s) (LRB):  XI ROBOTIC REPAIR, HERNIA, INGUINAL, LEFT w/ mesh, possible right (Left)  Scheduled for 5/26/2025    HPI 05/26/2025:  David Diamond is a 65 y.o. male with no signficant medical history. Presents for above procedure.           Problem List[1]    Review of patient's allergies indicates:   Allergen Reactions    Pcn [penicillins] Other (See Comments)     As a child       Outpatient Medications:  Medications Ordered Prior to Encounter[2]     Current Inpatient Medications:   acetaminophen  1,000 mg Oral Once Pre-Op    LIDOcaine (PF) 10 mg/ml (1%)           Past Surgical History:   Procedure Laterality Date    CHOLECYSTECTOMY      HIP ARTHROPLASTY Left     Tonsils         Social History[3]    OBJECTIVE:     Weight:  Wt Readings from Last 1 Encounters:   04/09/25 95.4 kg (210 lb 3.3 oz)     There is no height or weight on file to calculate BMI.    Recent Blood Pressure Readings:  BP Readings from Last 3 Encounters:   04/09/25 130/69   03/28/25 108/60   03/20/25 106/66       Vital Signs Range (Last 24H):  Pulse:  [48]       CBC:   Lab Results   Component Value Date    WBC 4.98 03/28/2025    HGB 14.4 03/28/2025    HCT 44.7 03/28/2025    MCV 95 03/28/2025     03/28/2025       CMP:     Chemistry        Component Value Date/Time     03/28/2025 0919     11/24/2023 1351    K 5.0 03/28/2025 0919    K 4.5 11/24/2023 1351     03/28/2025 0919     11/24/2023 1351    CO2 28 03/28/2025 0919    CO2 24 11/24/2023 1351    BUN 20 03/28/2025 0919    CREATININE 0.9 03/28/2025 0919    GLU 98 03/28/2025 0919     11/24/2023 1351        Component Value Date/Time    CALCIUM 9.4 03/28/2025 0919    CALCIUM 9.5 11/24/2023 1351    ALKPHOS 45 (L) 11/24/2023 1351    AST 24 11/24/2023 1351    ALT 20  "11/24/2023 1351    BILITOT 0.5 11/24/2023 1351    ESTGFRAFRICA >60.0 03/03/2016 0813    EGFRNONAA >60.0 03/03/2016 0813            INR:  No results found for: "INR", "PROTIME"    Diagnostic Studies:      EKG:     Results for orders placed or performed during the hospital encounter of 04/09/25   SCHEDULED EKG 12-LEAD (to Muse)    Collection Time: 04/09/25  3:24 PM   Result Value Ref Range    QRS Duration 92 ms    [facility] QTC Calculation 401 ms    Narrative    Test Reason : K40.90,    Vent. Rate :  53 BPM     Atrial Rate :  53 BPM     P-R Int : 178 ms          QRS Dur :  92 ms      QT Int : 428 ms       P-R-T Axes :  76  71  68 degrees    QTcB Int : 401 ms    Sinus bradycardia  Otherwise normal ECG  When compared with ECG of 18-Jan-2012 14:31,  Nonspecific T wave abnormality no longer evident in Inferior leads  Confirmed by Dat Kelly ([id number]) on 4/9/2025 4:27:31 PM    Referred By: CHRYSTAL JHA           Confirmed By: Dat Kelly       2D Echo:    No results found for this or any previous visit.    No results found for this or any previous visit.    No results found for this or any previous visit.      ASSESSMENT/PLAN:           Pre-op Assessment    I have reviewed the Patient Summary Reports.    I have reviewed the NPO Status.      Review of Systems  Anesthesia Hx:               Denies Personal Hx of Anesthesia complications.                    Cardiovascular:  Cardiovascular Normal Exercise tolerance: good                                             Pulmonary:  Pulmonary Normal                       Renal/:  Renal/ Normal                 Neurological:  Neurology Normal                                      Endocrine:  Endocrine Normal                Physical Exam  General: Well nourished, Cooperative, Alert and Oriented    Airway:  Mallampati: II   Mouth Opening: Normal  TM Distance: Normal  Tongue: Normal  Neck ROM: Normal ROM    Dental:  Intact    Chest/Lungs:  Normal Respiratory " Rate    Heart:  Rate: Bradycardia        Anesthesia Plan  Type of Anesthesia, risks & benefits discussed:    Anesthesia Type: Gen ETT  Intra-op Monitoring Plan: Standard ASA Monitors  Post Op Pain Control Plan: multimodal analgesia and IV/PO Opioids PRN  Induction:  IV  Airway Plan: Video  Informed Consent: Informed consent signed with the Patient and all parties understand the risks and agree with anesthesia plan.  All questions answered.   ASA Score: 1  Day of Surgery Review of History & Physical: H&P Update referred to the surgeon/provider.    Ready For Surgery From Anesthesia Perspective.     .           [1]   Patient Active Problem List  Diagnosis    Lipoma    Left inguinal hernia    Routine physical examination   [2]   No current facility-administered medications on file prior to encounter.     Current Outpatient Medications on File Prior to Encounter   Medication Sig Dispense Refill    fluorouraciL (EFUDEX) 5 % cream APPLY TOPICALLY TO THE AFFECTED AREA AT BEDTIME FOR 2 WEEKS (Patient not taking: Reported on 2025) 40 g 3   [3]   Social History  Socioeconomic History    Marital status:    Tobacco Use    Smoking status: Former     Current packs/day: 0.00     Average packs/day: 0.3 packs/day for 1 year (0.3 ttl pk-yrs)     Types: Cigarettes     Start date: 10/2/1969     Quit date: 10/2/1970     Years since quittin.6    Smokeless tobacco: Former   Substance and Sexual Activity    Drug use: No     Social Drivers of Health     Financial Resource Strain: Low Risk  (3/21/2025)    Overall Financial Resource Strain (CARDIA)     Difficulty of Paying Living Expenses: Not hard at all   Food Insecurity: No Food Insecurity (3/21/2025)    Hunger Vital Sign     Worried About Running Out of Food in the Last Year: Never true     Ran Out of Food in the Last Year: Never true   Transportation Needs: No Transportation Needs (3/21/2025)    PRAPARE - Transportation     Lack of Transportation (Medical): No     Lack of  Transportation (Non-Medical): No   Physical Activity: Sufficiently Active (3/21/2025)    Exercise Vital Sign     Days of Exercise per Week: 7 days     Minutes of Exercise per Session: 60 min   Stress: No Stress Concern Present (3/21/2025)    [employer] - Occupational Stress Questionnaire     Feeling of Stress : Not at all   Housing Stability: Low Risk  (3/21/2025)    Housing Stability Vital Sign     Unable to Pay for Housing in the Last Year: No     Number of Times Moved in the Last Year: 0     Homeless in the Last Year: No

## 2025-05-26 NOTE — H&P
FOCUSED SURGICAL H&P    David D Lobo is a 65 y.o. male. MRN is [id number].    CC: Here today for the following surgical procedure(s):  XI ROBOTIC REPAIR, HERNIA, INGUINAL, LEFT w/ mesh, possible right (Left: Abdomen)    HPI: For a detailed history of the patients history of present illness please refer to the last progress note. In brief, this is a 65 y.o. male with a known history of left inguinal hernia, here today for surgical intervention. There has been no recent changes in the patients health, including fevers, chest pain, or shortness of breath, and no new medications have been started. The patient has not had anything to eat or drink for the last 8 hours. The patient has held all blood thinners a week ago.       Past Medical History:   Past Medical History:   Diagnosis Date    Squamous cell carcinoma        Past Surgical History:   Past Surgical History:   Procedure Laterality Date    CHOLECYSTECTOMY      HIP ARTHROPLASTY Left     Tonsils         Social History: Social History[1]    Family History:   Family History   Problem Relation Name Age of Onset    Skin cancer Father            Allergies:  Review of patient's allergies indicates:   Allergen Reactions    Pcn [penicillins] Other (See Comments)     As a child         Medications:  Current Medications[2]                  Vital Signs:  Vitals:    05/26/25 0610   BP: 118/67   Pulse: (!) 51   Resp: 14   Temp: 97.7 °F (36.5 °C)         Physical Exam:  Neuro: awake, alert, no acute distress.  HEENT: PERRLA, neck supple, no lymphadenopathy.  Heart: regular rate/rhythm  Lungs: equal chest expansion bilaterally, no increased work of breathing on RA  Abdomen: soft, non-distended, non-tender to palpation., left inguinal hernia marked  Extremities: warm, well-perfused       Labs:  Lab Results   Component Value Date/Time    WBC 4.98 03/28/2025 09:19 AM    HGB 14.4 03/28/2025 09:19 AM    HGB 14.7 11/24/2023 01:51 PM    HCT 44.7 03/28/2025 09:19 AM    HCT 43.9 11/24/2023  "01:51 PM     2025 09:19 AM     2023 01:51 PM    MCV 95 2025 09:19 AM    MCV 94 2023 01:51 PM     Lab Results   Component Value Date/Time     2025 09:19 AM     2023 01:51 PM    K 5.0 2025 09:19 AM    K 4.5 2023 01:51 PM     2025 09:19 AM     2023 01:51 PM    CO2 28 2025 09:19 AM    CO2 24 2023 01:51 PM    BUN 20 2025 09:19 AM    GLU 98 2025 09:19 AM     2023 01:51 PM     No results found for: "INR", "PT", "PTT"  No components found for: "TROPI"  Lab Results   Component Value Date/Time    ALT 20 2023 01:51 PM    AST 24 2023 01:51 PM          Assessment/Plan:  65 y.o. male here today for the following surgical procedure:    XI ROBOTIC REPAIR, HERNIA, INGUINAL, LEFT w/ mesh, possible right (Left: Abdomen)       The indications for surgery, highlighting the risks and benefits of the procedure were discussed with the patient. These included but are not limited to swelling, bleeding, pain, infection, and adverse anesthesia-related event. I also discussed risk of injury to nearby structures. The patient seems to understand the risks, as well as the alternatives including nonoperative observation/survellience and wishes to proceed with the surgical intervention.    Patient has been examined, consented, and marked for laterality.      Plan discussed with and agreed by Dr. Dylan Silva MD         [1]   Social History  Socioeconomic History    Marital status:    Tobacco Use    Smoking status: Former     Current packs/day: 0.00     Average packs/day: 0.3 packs/day for 1 year (0.3 ttl pk-yrs)     Types: Cigarettes     Start date: 10/2/1969     Quit date: 10/2/1970     Years since quittin.6    Smokeless tobacco: Former   Substance and Sexual Activity    Drug use: No     Social Drivers of Health     Financial Resource Strain: Low Risk  (3/21/2025)    Overall " Financial Resource Strain (CARDIA)     Difficulty of Paying Living Expenses: Not hard at all   Food Insecurity: No Food Insecurity (3/21/2025)    Hunger Vital Sign     Worried About Running Out of Food in the Last Year: Never true     Ran Out of Food in the Last Year: Never true   Transportation Needs: No Transportation Needs (3/21/2025)    PRAPARE - Transportation     Lack of Transportation (Medical): No     Lack of Transportation (Non-Medical): No   Physical Activity: Sufficiently Active (3/21/2025)    Exercise Vital Sign     Days of Exercise per Week: 7 days     Minutes of Exercise per Session: 60 min   Stress: No Stress Concern Present (3/21/2025)    [employer] - Occupational Stress Questionnaire     Feeling of Stress : Not at all   Housing Stability: Low Risk  (3/21/2025)    Housing Stability Vital Sign     Unable to Pay for Housing in the Last Year: No     Number of Times Moved in the Last Year: 0     Homeless in the Last Year: No   [2]   Current Facility-Administered Medications:     0.9% NaCl infusion, , Intravenous, Continuous, Wendy Herrera MD    acetaminophen tablet 1,000 mg, 1,000 mg, Oral, Once Pre-Op, Amirah Maldonado DO    acetaminophen tablet 650 mg, 650 mg, Oral, Q8H PRN, Jojo Silva MD    ceFAZolin 2 g, 2 g, Intravenous, On Call Procedure, Jojo Silva MD    melatonin tablet 6 mg, 6 mg, Oral, Nightly PRN, Jojo Silva MD    ondansetron disintegrating tablet 8 mg, 8 mg, Oral, Q8H PRN, Jojo Silva MD    sodium chloride 0.9% flush 10 mL, 10 mL, Intravenous, PRN, Jojo Silva MD

## 2025-05-26 NOTE — ANESTHESIA PROCEDURE NOTES
Intubation    Date/Time: 5/26/2025 7:12 AM    Performed by: Amirah Maldonado DO  Authorized by: Artur Monteiro MD    Intubation:     Induction:  Intravenous    Intubated:  Postinduction    Mask Ventilation:  Easy mask    Attempts:  1    Attempted By:  Resident anesthesiologist    Method of Intubation:  Direct    Blade:  Raymond 3    Laryngeal View Grade: Grade I - full view of cords      Difficult Airway Encountered?: No      Complications:  None    Airway Device:  Oral endotracheal tube    Airway Device Size:  7.5    Style/Cuff Inflation:  Cuffed    Tube secured:  24    Secured at:  The teeth    Placement Verified By:  Capnometry    Complicating Factors:  None    Findings Post-Intubation:  BS equal bilateral and atraumatic/condition of teeth unchanged

## 2025-05-26 NOTE — ANESTHESIA POSTPROCEDURE EVALUATION
Anesthesia Post Evaluation    Patient: David Diamond    Procedure(s) Performed: Procedure(s) (LRB):  XI ROBOTIC REPAIR, HERNIA, INGUINAL, LEFT w/ mesh, possible right (Left)    Final Anesthesia Type: general      Patient location during evaluation: PACU  Patient participation: Yes- Able to Participate  Level of consciousness: awake and alert and oriented  Post-procedure vital signs: reviewed and stable  Pain management: adequate  Airway patency: patent    PONV status at discharge: No PONV  Anesthetic complications: no      Cardiovascular status: hemodynamically stable  Respiratory status: unassisted, spontaneous ventilation and room air  Hydration status: euvolemic  Follow-up not needed.              Vitals Value Taken Time   /68 05/26/25 12:46   Temp 36.6 °C (97.9 °F) 05/26/25 09:30   Pulse 65 05/26/25 12:49   Resp 20 05/26/25 12:49   SpO2 95 % 05/26/25 12:49   Vitals shown include unfiled device data.      No case tracking events are documented in the log.      Pain/Gonzalo Score: Pain Rating Prior to Med Admin: 6 (5/26/2025 10:11 AM)  Gonzalo Score: 10 (5/26/2025 12:41 PM)

## 2025-05-26 NOTE — BRIEF OP NOTE
[facility] - Surgery ([facility])  Brief Operative Note    Surgery Date: 5/26/2025     Surgeons and Role:     * Wendy Herrera MD - Primary     * Juventino Boogie MD - Resident - Assisting        Pre-op Diagnosis:  Left inguinal hernia [K40.90]    Post-op Diagnosis:  Post-Op Diagnosis Codes:     * Left inguinal hernia [K40.90]    Procedure(s) (LRB):  XI ROBOTIC REPAIR, HERNIA, INGUINAL, LEFT w/ mesh, possible right (Left)    Anesthesia: General    Operative Findings: robotic left inguinal hernia repair with mesh    Estimated Blood Loss: 7mL         Specimens:   Specimen (24h ago, onward)      None            * No specimens in log *        Discharge Note    OUTCOME: Patient tolerated treatment/procedure well without complication and is now ready for discharge.    DISPOSITION: Home or Self Care    FINAL DIAGNOSIS:  Left inguinal hernia    FOLLOWUP: In clinic    DISCHARGE INSTRUCTIONS:    Discharge Procedure Orders   Diet Adult Regular     Lifting restrictions   Order Comments: No lifting greater than 10 pounds for 6 weeks from day of surgery.  No pushing/pulling such as vacuuming or raking.  No straining, avoid constipation and take stool softeners as described and laxatives as needed.  No driving while on narcotics and until you can react quickly without pain.     Other restrictions (specify):   Order Comments: Post-op Instructions  Please take pain medication as needed, if taking narcotics please avoid driving.    We recommend a high fiber diet and use of stool softeners while on narcotics as they might cause constipation.    You may leave the dressing in place for 48 hours.   Okay to shower in 24 hours   Avoid swimming pools, baths or hot tubs for at least 2 weeks.   Do not lift anything heavier than 10 lb for at least 2 weeks.   Please call if you have fevers, chills, shortness of breath, increased drainage from your wound or bleeding.  Please call to make an appointment in 2 weeks for wound recheck.      No driving until:   Order Comments: Off of narcotic pain medication, physically able to perform motions necessary for operating motor vehicle     Notify your health care provider if you experience any of the following:  temperature >100.4     Notify your health care provider if you experience any of the following:  persistent nausea and vomiting or diarrhea     Notify your health care provider if you experience any of the following:  severe uncontrolled pain     Notify your health care provider if you experience any of the following:  redness, tenderness, or signs of infection (pain, swelling, redness, odor or green/yellow discharge around incision site)     Notify your health care provider if you experience any of the following:  difficulty breathing or increased cough     Notify your health care provider if you experience any of the following:  severe persistent headache     No dressing needed     Activity as tolerated

## 2025-05-26 NOTE — OP NOTE
DATE OF PROCEDURE: 5/26/2025    PRE OP DIAGNOSIS: Left inguinal hernia [K40.90]    POST OP DIAGNOSIS: Sliding-type left inguinal hernia     PROCEDURE: Procedure(s) (LRB):  XI ROBOTIC REPAIR, HERNIA, INGUINAL, LEFT (Left)    Surgeons and Role:     * Wendy Herrera MD - Primary     * Juventino Boogie MD - Resident - Assisting    ANESTHESIA: General    INDICATION: Patient is a 65 year-old man who presented with a symptomatic left inguinal hernia. Work-up revealed a left-sided bowel-containing inguinal hernia. After discussing the risks, benefits and alternatives, he elected to proceed with robotic KELLI left inguinal hernia repair with mesh. Informed consent was obtained.     FINDINGS:  1. Large left indirect inguinal hernia with sliding sigmoid colon component and small cord lipoma, reduced and repaired  2. No inguinal hernia on the right  3. KELLI repair of left inguinal hernia with Bard 3D Max MID anatomic mesh      PROCEDURE IN DETAIL: The patient was met in the pre-op area, his identity and consent confirmed and surgical site marked. He was then brought to the Operating Room and positioned supine. General anesthesia was induced and he was intubated without incident. SCDs and a warming blanket were placed and pre-op IV antibiotics were infused within 30 minutes of the incision. His abdomen and groins were prepped and draped in sterile fashion and a pre-procedural pause was performed by all members of the surgical and anesthesia teams. Access to the abdomen was gained in the upper abdomen to the right of midline with an 8-mm Optiview trocar under direct vision. The abdomen was insufflated to 15-mmHg with CO2 and inspection yielded no injury. Two additional 8-mm trocars were placed 8-cm on either side of the camera port under visualization. The patient was placed in slight Trendelenburg and the Xi robot docked. At this point I scrubbed out and moved to the surgeons console. Inspection confirmed a large  left inguinal hernia containing sigmoid colon with a sliding component. The left peritoneum was scored medially at the median umbilical ligament and extended laterally to the ASIS 6-cm cephalad to the internal ring. We dissected in the preperitoneal space to create a peritoneal flap. Dissection was carried down laterally to the anterior superior iliac spine and medially to the pubic symphysis and ramus. A large indirect hernia with associated small cord lipoma was noted which was fully reduced. The cord structures were identified and swept off the hernia sac and lipoma which were cleared circumferentially and brought deeply and freely into the preperitoneal space. There was no evidence of a direct or femoral hernia. The peritoneum and lipoma were cleared off the cord structures and fully reduced. Large Bard 3D Max MID anatomic mesh were placed and fashioned such that all potential hernia defects were covered and the reduced peritoneum and retroperitoneal contents were below the inferior border of the mesh. The mesh was anchored medially to Tomas's ligament with a 2-0 vicryl suture. The peritoneal flap was closed with running 3-0 absorbable V-loc suture in a running fashion. The needles were removed and hemostasis was ensured. The robot was undocked and the abdomen was desufflated and the remaining ports were removed under visualization. All skin incisions were closed with 4-0 Monocryl and reinforced with Dermabond. The patient was awoken from anesthesia and extubated without complication. He was brought to the PACU in good condition having tolerated the operation well.     EBL: 5 mL  Specimens: None  Drains: None  Complications: None apparent   Disposition: Home from PACU after voiding     Surgical sponge and instrument count was correct at the end of the case. I was present and scrubbed (or at the console) for the entirety of this operation.     Wendy Richardson  5/26/2025

## 2025-06-25 ENCOUNTER — OFFICE VISIT (OUTPATIENT)
Dept: SURGERY | Facility: CLINIC | Age: 65
End: 2025-06-25
Payer: COMMERCIAL

## 2025-06-25 VITALS
SYSTOLIC BLOOD PRESSURE: 128 MMHG | HEIGHT: 70 IN | BODY MASS INDEX: 29.05 KG/M2 | WEIGHT: 202.94 LBS | DIASTOLIC BLOOD PRESSURE: 72 MMHG | HEART RATE: 54 BPM

## 2025-06-25 DIAGNOSIS — Z87.19 S/P LEFT INGUINAL HERNIA REPAIR: Primary | ICD-10-CM

## 2025-06-25 DIAGNOSIS — Z98.890 S/P LEFT INGUINAL HERNIA REPAIR: Primary | ICD-10-CM

## 2025-06-25 PROCEDURE — 3078F DIAST BP <80 MM HG: CPT | Mod: CPTII,S$GLB,, | Performed by: SURGERY

## 2025-06-25 PROCEDURE — 1126F AMNT PAIN NOTED NONE PRSNT: CPT | Mod: CPTII,S$GLB,, | Performed by: SURGERY

## 2025-06-25 PROCEDURE — 99999 PR PBB SHADOW E&M-EST. PATIENT-LVL III: CPT | Mod: PBBFAC,,, | Performed by: SURGERY

## 2025-06-25 PROCEDURE — 3288F FALL RISK ASSESSMENT DOCD: CPT | Mod: CPTII,S$GLB,, | Performed by: SURGERY

## 2025-06-25 PROCEDURE — 1101F PT FALLS ASSESS-DOCD LE1/YR: CPT | Mod: CPTII,S$GLB,, | Performed by: SURGERY

## 2025-06-25 PROCEDURE — 3074F SYST BP LT 130 MM HG: CPT | Mod: CPTII,S$GLB,, | Performed by: SURGERY

## 2025-06-25 PROCEDURE — 3044F HG A1C LEVEL LT 7.0%: CPT | Mod: CPTII,S$GLB,, | Performed by: SURGERY

## 2025-06-25 PROCEDURE — 99024 POSTOP FOLLOW-UP VISIT: CPT | Mod: S$GLB,,, | Performed by: SURGERY

## 2025-06-25 PROCEDURE — 1159F MED LIST DOCD IN RCRD: CPT | Mod: CPTII,S$GLB,, | Performed by: SURGERY

## 2025-06-25 NOTE — PROGRESS NOTES
Minimally Invasive Surgery  Follow Up Clinic Note    Subjective:     David Diamond is a 65 y.o. male who presents to clinic for follow up s/p XI ROBOTIC REPAIR, HERNIA, INGUINAL, LEFT w/ mesh, possible right - Left on 5/26/2025. Pt reports that he is doing very well. He has had no problems post-operatively. He denies any pain. Tolerating a regular diet and having regular bowel movements. Denies fever, chills, chest pain, and shortness of breath. Pain is well controlled. Denies redness or drainage of incision. Reports he is getting back into walking. He reports he has not had any recurrence of a bulge since surgery.     Medications:  Medications Ordered Prior to Encounter[1]      Objective:     PHYSICAL EXAM:  Vital Signs (Most Recent)  Pulse: (!) 54 (06/25/25 1439)  BP: 128/72 (06/25/25 1439)    Physical Exam:  Gen: no apparent distress, awake and alert  CV: regular rate/rhythm  Pulm: non-labored breathing, equal and bilateral chest rise  Abd: soft, non-distended, non-tender  Ext: warm and well perfused, no edema  Skin: warm and dry, no lesions appreciated  Incision: c/d/I, no surrounding erythema or edema  Neuro: motor and sensation grossly intact and symmetric       Assessment:     David Diamond is a 65 y.o. male presenting to clinic today for follow up s/p robotic left inguinal hernia repair. He is doing very well and has no complaints today. No evidence of recurrence. Incisions are well healed.     Plan:     - Patient is recovering well  - Can advance to a regular diet as tolerated  - We discussed a gradual return to routine activity and exercise as tolerated.  - Continue with lifting restrictions of no more than 10 lbs for 4 more weeks  - RTC PRPAUL Jimenez MD   General Surgery PGY-1         [1]   Current Outpatient Medications on File Prior to Visit   Medication Sig Dispense Refill    fluorouraciL (EFUDEX) 5 % cream APPLY TOPICALLY TO THE AFFECTED AREA AT BEDTIME FOR 2 WEEKS 40 g 3    oxyCODONE  (ROXICODONE) 5 MG immediate release tablet Take 1 tablet (5 mg total) by mouth every 6 (six) hours as needed for Pain. 16 tablet 0     No current facility-administered medications on file prior to visit.

## 2025-07-27 DIAGNOSIS — L57.0 MULTIPLE ACTINIC KERATOSES: ICD-10-CM

## 2025-07-28 RX ORDER — FLUOROURACIL 50 MG/G
CREAM TOPICAL
Qty: 40 G | Refills: 3 | Status: SHIPPED | OUTPATIENT
Start: 2025-07-28

## (undated) DEVICE — GOWN POLY REINF BRTH SLV XL

## (undated) DEVICE — ELECTRODE MEGADYNE RETURN DUAL

## (undated) DEVICE — PENCIL ROCKER SWITCH 10FT CORD

## (undated) DEVICE — COVER LIGHT HANDLE

## (undated) DEVICE — SYR 10CC LUER LOCK

## (undated) DEVICE — DRAPE STERI INSTRUMENT 1018

## (undated) DEVICE — NDL HYPO REG 25G X 1 1/2

## (undated) DEVICE — SUT MCRYL PLUS 4-0 PS2 27IN

## (undated) DEVICE — COVER TIP CURVED SCISSORS XI

## (undated) DEVICE — ADHESIVE DERMABOND ADVANCED

## (undated) DEVICE — BLADE SURG CARBON STEEL SZ11

## (undated) DEVICE — DRAPE SCOPE PILLOW WARMER

## (undated) DEVICE — MARKER SKIN RULER STERILE

## (undated) DEVICE — DRAPE ABDOMINAL TIBURON 14X11

## (undated) DEVICE — SOL ELECTROLUBE ANTI-STIC

## (undated) DEVICE — TOWEL OR DISP STRL BLUE 4/PK

## (undated) DEVICE — DRAPE ARM DAVINCI XI

## (undated) DEVICE — KIT ANTIFOG W/SPONG & FLUID

## (undated) DEVICE — ELECTRODE REM PLYHSV RETURN 9

## (undated) DEVICE — Device

## (undated) DEVICE — SEAL UNIVERSAL 5MM-8MM XI

## (undated) DEVICE — SUT VICRYL CTD 2-0 GI 27 SH

## (undated) DEVICE — OBTURATOR BLADELESS 8MM XI CLR

## (undated) DEVICE — TRAY MINOR GEN SURG OMC

## (undated) DEVICE — SUT VLOC 90 3-0 V-20 NDL 6

## (undated) DEVICE — DRAPE COLUMN DAVINCI XI